# Patient Record
Sex: FEMALE | Race: BLACK OR AFRICAN AMERICAN | Employment: PART TIME | URBAN - METROPOLITAN AREA
[De-identification: names, ages, dates, MRNs, and addresses within clinical notes are randomized per-mention and may not be internally consistent; named-entity substitution may affect disease eponyms.]

---

## 2017-02-08 ENCOUNTER — HOSPITAL ENCOUNTER (EMERGENCY)
Age: 27
Discharge: HOME | End: 2017-02-08
Attending: EMERGENCY MEDICINE
Payer: MEDICAID

## 2017-02-08 VITALS
WEIGHT: 146 LBS | OXYGEN SATURATION: 100 % | RESPIRATION RATE: 18 BRPM | BODY MASS INDEX: 21.62 KG/M2 | HEIGHT: 69 IN | SYSTOLIC BLOOD PRESSURE: 119 MMHG | DIASTOLIC BLOOD PRESSURE: 74 MMHG | HEART RATE: 80 BPM | TEMPERATURE: 98.6 F

## 2017-02-08 DIAGNOSIS — N39.0 URINARY TRACT INFECTION WITH HEMATURIA, SITE UNSPECIFIED: Primary | ICD-10-CM

## 2017-02-08 DIAGNOSIS — R31.9 URINARY TRACT INFECTION WITH HEMATURIA, SITE UNSPECIFIED: Primary | ICD-10-CM

## 2017-02-08 LAB
ANION GAP BLD CALC-SCNC: 9 MMOL/L (ref 3–18)
APPEARANCE UR: ABNORMAL
BACTERIA URNS QL MICRO: ABNORMAL /HPF
BASOPHILS # BLD AUTO: 0 K/UL (ref 0–0.06)
BASOPHILS # BLD: 1 % (ref 0–2)
BILIRUB UR QL: NEGATIVE
BUN SERPL-MCNC: 11 MG/DL (ref 7–18)
BUN/CREAT SERPL: 14 (ref 12–20)
CALCIUM SERPL-MCNC: 9.5 MG/DL (ref 8.5–10.1)
CHLORIDE SERPL-SCNC: 102 MMOL/L (ref 100–108)
CO2 SERPL-SCNC: 29 MMOL/L (ref 21–32)
COLOR UR: YELLOW
CREAT SERPL-MCNC: 0.78 MG/DL (ref 0.6–1.3)
DIFFERENTIAL METHOD BLD: ABNORMAL
EOSINOPHIL # BLD: 0.1 K/UL (ref 0–0.4)
EOSINOPHIL NFR BLD: 1 % (ref 0–5)
EPITH CASTS URNS QL MICRO: ABNORMAL /LPF (ref 0–5)
ERYTHROCYTE [DISTWIDTH] IN BLOOD BY AUTOMATED COUNT: 12.5 % (ref 11.6–14.5)
GLUCOSE SERPL-MCNC: 87 MG/DL (ref 74–99)
GLUCOSE UR STRIP.AUTO-MCNC: NEGATIVE MG/DL
HCG UR QL: NEGATIVE
HCT VFR BLD AUTO: 39.6 % (ref 35–45)
HGB BLD-MCNC: 13.6 G/DL (ref 12–16)
HGB UR QL STRIP: NEGATIVE
KETONES UR QL STRIP.AUTO: NEGATIVE MG/DL
LEUKOCYTE ESTERASE UR QL STRIP.AUTO: ABNORMAL
LYMPHOCYTES # BLD AUTO: 55 % (ref 21–52)
LYMPHOCYTES # BLD: 2.4 K/UL (ref 0.9–3.6)
MCH RBC QN AUTO: 28 PG (ref 24–34)
MCHC RBC AUTO-ENTMCNC: 34.3 G/DL (ref 31–37)
MCV RBC AUTO: 81.5 FL (ref 74–97)
MONOCYTES # BLD: 0.4 K/UL (ref 0.05–1.2)
MONOCYTES NFR BLD AUTO: 9 % (ref 3–10)
MUCOUS THREADS URNS QL MICRO: ABNORMAL /LPF
NEUTS SEG # BLD: 1.5 K/UL (ref 1.8–8)
NEUTS SEG NFR BLD AUTO: 34 % (ref 40–73)
NITRITE UR QL STRIP.AUTO: NEGATIVE
PH UR STRIP: 5.5 [PH] (ref 5–8)
PLATELET # BLD AUTO: 179 K/UL (ref 135–420)
PMV BLD AUTO: 11.4 FL (ref 9.2–11.8)
POTASSIUM SERPL-SCNC: 3.7 MMOL/L (ref 3.5–5.5)
PROT UR STRIP-MCNC: NEGATIVE MG/DL
RBC # BLD AUTO: 4.86 M/UL (ref 4.2–5.3)
RBC #/AREA URNS HPF: 0 /HPF (ref 0–5)
SODIUM SERPL-SCNC: 140 MMOL/L (ref 136–145)
SP GR UR REFRACTOMETRY: 1.03 (ref 1–1.03)
UROBILINOGEN UR QL STRIP.AUTO: 0.2 EU/DL (ref 0.2–1)
WBC # BLD AUTO: 4.4 K/UL (ref 4.6–13.2)
WBC URNS QL MICRO: ABNORMAL /HPF (ref 0–4)

## 2017-02-08 PROCEDURE — 80048 BASIC METABOLIC PNL TOTAL CA: CPT | Performed by: EMERGENCY MEDICINE

## 2017-02-08 PROCEDURE — 81025 URINE PREGNANCY TEST: CPT | Performed by: EMERGENCY MEDICINE

## 2017-02-08 PROCEDURE — 87086 URINE CULTURE/COLONY COUNT: CPT | Performed by: PHYSICIAN ASSISTANT

## 2017-02-08 PROCEDURE — 81001 URINALYSIS AUTO W/SCOPE: CPT | Performed by: EMERGENCY MEDICINE

## 2017-02-08 PROCEDURE — 99283 EMERGENCY DEPT VISIT LOW MDM: CPT

## 2017-02-08 PROCEDURE — 85025 COMPLETE CBC W/AUTO DIFF WBC: CPT | Performed by: EMERGENCY MEDICINE

## 2017-02-08 RX ORDER — NITROFURANTOIN 25; 75 MG/1; MG/1
100 CAPSULE ORAL 2 TIMES DAILY
Qty: 14 CAP | Refills: 0 | Status: SHIPPED | OUTPATIENT
Start: 2017-02-08 | End: 2017-02-15

## 2017-02-08 RX ORDER — PHENAZOPYRIDINE HYDROCHLORIDE 200 MG/1
200 TABLET, FILM COATED ORAL 3 TIMES DAILY
Qty: 6 TAB | Refills: 0 | Status: SHIPPED | OUTPATIENT
Start: 2017-02-08 | End: 2017-02-10

## 2017-02-08 NOTE — ED NOTES
I performed a brief evaluation, including history and physical, of the patient here in triage and I have determined that pt will need further treatment and evaluation from the main side ER physician. I have placed initial orders to help in expediting patients care. February 08, 2017 at 11:26 AM - Blane Beltran MD        Visit Vitals    Pulse 74    Temp 98.9 °F (37.2 °C)    Resp 14    Ht 5' 9\" (1.753 m)    Wt 66.2 kg (146 lb)    SpO2 98%    BMI 21.56 kg/m2          Patient with abdominal and pelvic pain for the last three days. More pain in right pelvic than left per patient. Denied vaginal discharge. Urinary frequency is present.   DEVIN BERGERON

## 2017-02-08 NOTE — ED PROVIDER NOTES
Patient is a 27 y.o. female presenting with abdominal pain and frequency. The history is provided by the patient. Abdominal Pain    Associated symptoms include frequency. Urinary Frequency    Associated symptoms include frequency and abdominal pain. Chyna Tuttle is a 27 y.o. female presents with c/o abdominal pain for the past couple of days. Denies fever or n/v. Denies vaginal discharge or pain. History reviewed. No pertinent past medical history. History reviewed. No pertinent past surgical history. History reviewed. No pertinent family history. Social History     Social History    Marital status: SINGLE     Spouse name: N/A    Number of children: N/A    Years of education: N/A     Occupational History    Not on file. Social History Main Topics    Smoking status: Never Smoker    Smokeless tobacco: Not on file    Alcohol use No    Drug use: No    Sexual activity: Not on file     Other Topics Concern    Not on file     Social History Narrative         ALLERGIES: Bactrim [sulfamethoprim ds]    Review of Systems   Gastrointestinal: Positive for abdominal pain. Genitourinary: Positive for frequency. Constitutional:  Denies malaise, fever, chills. Head:  Denies injury. Face:  Denies injury or pain. ENMT:  Denies sore throat. Neck:  Denies injury or pain. Chest:  Denies injury. Cardiac:  Denies chest pain or palpitations. Respiratory:  Denies cough, wheezing, difficulty breathing, shortness of breath. GI/ABD: Pain  Denies injury, distention, nausea, vomiting, diarrhea. :  Frequency Denies injury, pain, dysuria or urgency. Back:  Denies injury or pain. Pelvis:  Denies injury or pain. Extremity/MS:  Denies injury or pain. Neuro:  Denies headache, LOC, dizziness, neurologic symptoms/deficits/paresthesias. Skin: Denies injury, rash, itching or skin changes.         Vitals:    02/08/17 1125   Pulse: 74   Resp: 14   Temp: 98.9 °F (37.2 °C)   SpO2: 98% Weight: 66.2 kg (146 lb)   Height: 5' 9\" (1.753 m)            Physical Exam   Nursing note and vitals reviewed. CONSTITUTIONAL: Alert, in no apparent distress; well-developed; well-nourished. HEAD:  Normocephalic, atraumatic. EYES: PERRL; EOM's intact. ENTM: Nose: No rhinorrhea; Throat: mucous membranes moist. Posterior pharynx-normal.  Neck:  No JVD, supple without lymphadenopathy. RESP: Chest clear, equal breath sounds. CV: S1 and S2 WNL; No murmurs, gallops or rubs. GI: Abdomen soft and non-tender. No masses or organomegaly. UPPER EXT:  Normal inspection. LOWER EXT: Normal inspection. NEURO: strength 5/5 and sym, sensation intact. SKIN: No rashes; Normal for age and stage. PSYCH:  Alert and oriented, normal affect. MDM  Number of Diagnoses or Management Options  Diagnosis management comments: Diagnosis management comments: DDX: Pyelonephritis, Acute Cystitis, Hemorrhagic Cystitis, Interstitial Cystitis, Pelvic Inflammatory Disease, Ovarian cyst pain, Nonspecific urethritis (chlamydia or ureaplasma), renal colic, Bladder outlet obstruction, Neurogenic bladder, Pelvic pain disorder, Endometriosis, Vaginitis, Urosepsis, Nephrolithiasis, Urolithiasis, Monolial Vaginitis, Trichomoniasis. IMPRESSION AND MEDICAL DECISION MAKING:  Based upon the patient's presentation with noted HPI and PE, along with the work up done in the emergency department, I believe that the patient has a UTI. Will treat and have her follow up with her PCP. The patient will be discharged home. Warning signs of worsening condition were discussed and understood by the patient. Based on patient's age, coexisting illness, exam, and the results of this ED evaluation, the decision to treat as an outpatient was made. Based on the information available at time of discharge, acute pathology requiring immediate intervention was deemed relative unlikely.  While it is impossible to completely exclude the possibility of underlying serious disease or worsening of condition, I feel the relative likelihood is extremely low. I discussed this uncertainty with the patient, who understood ED evaluation and treatment and felt comfortable with the outpatient treatment plan. All questions regarding care, test results, and follow up were answered. The patient is stable and appropriate to discharge. They understand that they should return to the emergency department for any new or worsening symptoms. I stressed the importance of follow up for repeat assessment and possibly further evaluation/treatment.            Amount and/or Complexity of Data Reviewed  Clinical lab tests: ordered and reviewed      ED Course       Procedures

## 2017-02-09 LAB
BACTERIA SPEC CULT: NORMAL
SERVICE CMNT-IMP: NORMAL

## 2019-04-11 ENCOUNTER — OFFICE VISIT (OUTPATIENT)
Dept: OBGYN CLINIC | Age: 29
End: 2019-04-11

## 2019-04-11 VITALS
DIASTOLIC BLOOD PRESSURE: 67 MMHG | RESPIRATION RATE: 20 BRPM | SYSTOLIC BLOOD PRESSURE: 114 MMHG | HEIGHT: 69 IN | OXYGEN SATURATION: 100 % | WEIGHT: 169.8 LBS | HEART RATE: 83 BPM | BODY MASS INDEX: 25.15 KG/M2

## 2019-04-11 DIAGNOSIS — Z30.09 FAMILY PLANNING: ICD-10-CM

## 2019-04-11 DIAGNOSIS — Z01.419 WELL WOMAN EXAM WITH ROUTINE GYNECOLOGICAL EXAM: Primary | ICD-10-CM

## 2019-04-11 DIAGNOSIS — Z11.3 ROUTINE SCREENING FOR STI (SEXUALLY TRANSMITTED INFECTION): ICD-10-CM

## 2019-04-11 RX ORDER — NORETHINDRONE ACETATE AND ETHINYL ESTRADIOL 1MG-20(21)
1 KIT ORAL DAILY
Qty: 3 PACKAGE | Refills: 3 | Status: SHIPPED | OUTPATIENT
Start: 2019-04-11 | End: 2021-12-21

## 2019-04-11 NOTE — PROGRESS NOTES
Subjective:   34 y.o. female for Well Woman Check. She is without complaints today. Patient's last menstrual period was 04/05/2019. Social History: single partner, contraception - none. Pertinent past medical hstory: no history of HTN, DVT, CAD, DM, liver disease, migraines or smoking. There is no problem list on file for this patient. There are no active problems to display for this patient. Allergies   Allergen Reactions    Bactrim [Sulfamethoprim] Hives     History reviewed. No pertinent past medical history. History reviewed. No pertinent surgical history. Family History   Problem Relation Age of Onset    Breast Cancer Maternal Grandmother      Social History     Tobacco Use    Smoking status: Never Smoker    Smokeless tobacco: Current User    Tobacco comment: vape no tobacco   Substance Use Topics    Alcohol use: Yes     Comment: socially        ROS:  Feeling well. No dyspnea or chest pain on exertion. No abdominal pain, change in bowel habits, black or bloody stools. No urinary tract symptoms. GYN ROS: normal menses, no abnormal bleeding, pelvic pain or discharge, no breast pain or new or enlarging lumps on self exam. No neurological complaints. Objective:     Visit Vitals  /67   Pulse 83   Resp 20   Ht 5' 9\" (1.753 m)   Wt 169 lb 12.8 oz (77 kg)   LMP 04/05/2019   SpO2 100%   BMI 25.08 kg/m²     The patient appears well, alert, oriented x 3, in no distress. ENT normal.  Neck supple. No adenopathy or thyromegaly. PATRIA. Lungs are clear, good air entry, no wheezes, rhonchi or rales. S1 and S2 normal, no murmurs, regular rate and rhythm. Abdomen soft without tenderness, guarding, mass or organomegaly. Extremities show no edema, normal peripheral pulses. Neurological is normal, no focal findings.     BREAST EXAM: breasts appear normal, no suspicious masses, no skin or nipple changes or axillary nodes    PELVIC EXAM: normal external genitalia, vulva, vagina, cervix, uterus and adnexa    Assessment/Plan:   well woman  pap smear  return annually or prn    ICD-10-CM ICD-9-CM    1. Well woman exam with routine gynecological exam Z01.419 V72.31 PAP IG, CT-NG-TV, RFX APTIMA HPV ASCUS ([id number])   2. Routine screening for STI (sexually transmitted infection) Z11.3 V74.5 T PALLIDUM AB      HEP B SURFACE AG      HEPATITIS C AB      HIV 1/2 AG/AB, [address] RFLX CONFIRM     Encounter Diagnoses   Name Primary?  Well woman exam with routine gynecological exam Yes    Routine screening for STI (sexually transmitted infection)      Orders Placed This Encounter    T PALLIDUM AB    HEP B SURFACE AG    HEPATITIS C AB    HIV 1/2 AG/AB, 4TH GENERATION,W RFLX CONFIRM    PAP IG, CT-NG-TV, RFX APTIMA HPV ASCUS ([id number])       .

## 2019-04-18 LAB
C TRACH RRNA CVX QL NAA+PROBE: NEGATIVE
CYTOLOGIST CVX/VAG CYTO: NORMAL
CYTOLOGY CVX/VAG DOC THIN PREP: NORMAL
DX ICD CODE: NORMAL
LABCORP, 190119: NORMAL
Lab: NORMAL
N GONORRHOEA RRNA CVX QL NAA+PROBE: NEGATIVE
OTHER STN SPEC: NORMAL
PATH REPORT.FINAL DX SPEC: NORMAL
STAT OF ADQ CVX/VAG CYTO-IMP: NORMAL
T VAGINALIS RRNA SPEC QL NAA+PROBE: NEGATIVE

## 2021-04-06 ENCOUNTER — OFFICE VISIT (OUTPATIENT)
Dept: SURGERY | Age: 31
End: 2021-04-06
Payer: MEDICAID

## 2021-04-06 VITALS
WEIGHT: 293 LBS | OXYGEN SATURATION: 100 % | SYSTOLIC BLOOD PRESSURE: 126 MMHG | RESPIRATION RATE: 20 BRPM | BODY MASS INDEX: 44.41 KG/M2 | TEMPERATURE: 97.6 F | HEART RATE: 80 BPM | DIASTOLIC BLOOD PRESSURE: 76 MMHG | HEIGHT: 68 IN

## 2021-04-06 DIAGNOSIS — E66.01 MORBID OBESITY WITH BMI OF 45.0-49.9, ADULT (HCC): Primary | ICD-10-CM

## 2021-04-06 PROCEDURE — 99205 OFFICE O/P NEW HI 60 MIN: CPT | Performed by: SURGERY

## 2021-04-06 NOTE — PROGRESS NOTES
Chief Complaint   Patient presents with    Advice Only     confirmed video     Pt ID confirmed    Weight Loss Metrics 4/6/2021 4/6/2021 4/11/2019 12/25/2017 2/8/2017 2/28/2016 10/15/2013   Pre op / Initial Wt 304 - - - - - -   Today's Wt - 304 lb 169 lb 12.8 oz 230 lb 146 lb 174 lb 142 lb   BMI - 46.22 kg/m2 25.08 kg/m2 36.02 kg/m2 21.56 kg/m2 25.68 kg/m2 20.96 kg/m2   Ideal Body Wt 143 - - - - - -   Excess Body Wt 161 - - - - - -   Goal Wt 175 - - - - - -   Wt loss to date 0 - - - - - -   % Wt Loss 0 - - - - - -   80% .8 - - - - - -       Body mass index is 46.22 kg/m².

## 2021-04-06 NOTE — PROGRESS NOTES
Consult    Patient: Hermilo Brewster MRN: [id number]  SSN: [id number]    YOB: 1990  Age: 36 y.o. Sex: female      Initial  Consultation for Bariatric Surgery     Hermilo Brewster is a   31-year-old black female who presents for discussion of surgical options available for definitive management of her clinically severe obesity. Onset obesity: Childhood  Weight at age 27: 280 pounds and a 5 foot 8 inch frame  Maximum/current weight: 304 pounds on 5 foot 8 inch frame with a body mass index of 46  Pattern/progression of weight gain: Slowly progressive interrupted by dietary weight loss followed by regain of lost weight as well as additional weight thus exhibiting yoyo effect after current maximum weight of 304 pounds  Maximum medical weight loss attempts: Multiple unsupervised and supervised weight loss trials of maximal loss occurring in  over 6 months losing 100 pounds. Comorbidities: Clinical obstructive sleep apnea  Current weight: 304. BMI: 46. Goal body weight: 43  Excess body weight: 61  Estimated postsurgical weight loss: 129  Postsurgical goal weight: 175  Allergies: Bactrim  Current medications: See medication list  1. Clinically severe obesity with body mass index of 46 with obesity related comorbidity of clinical obstructive sleep apnea and  2. Vitamin D deficiency  3. Anxiety  Past surgical history:  1. Laparoscopic cholecystectomy   Social history:  Denies utilization of both tobacco and alcohol  Family history: Mother 60-reactive airway disease  Father  56-unknown etiology  Siblings x5-healthy  Allergies   Allergen Reactions    Bactrim [Sulfamethoprim] Hives    Bactrim [Sulfamethoprim Ds] Hives     Denies allergy 2019       Current Outpatient Medications on File Prior to Visit   Medication Sig Dispense Refill    norethindrone-ethinyl estradiol (JUNEL FE 1/20) 1 mg-20 mcg (21)/75 mg (7) tab Take 1 Tab by mouth daily.  3 Package 3    phentermine (ADIPEX_P) 15 mg capsule Take 15 mg by mouth every morning. No current facility-administered medications on file prior to visit. No past medical history on file. Past Surgical History:   Procedure Laterality Date    HX CHOLECYSTECTOMY         Social History     Tobacco Use    Smoking status: Never Smoker    Smokeless tobacco: Current User    Tobacco comment: vape no tobacco   Substance Use Topics    Alcohol use: Not Currently     Comment: socially    Drug use: Never       Family History   Problem Relation Age of Onset    Breast Cancer Maternal Grandmother          Review of Systems:      General: Denies fevers, chills, night sweats, fatigue, weight loss, or weight gain. HEENT: Denies changes in auditory or visual acuity, recurrent pharyngitis, epistaxis, chronic rhinorrhea, vertigo    Respiratory: Denies increasing shortness of breath, productive cough, hemoptysis    Cardiac: Denies known history of cardiac disease, heart murmur, palpitations    GI: Denies dysphagia, recurrent emesis, hematemesis, changes in bowel habits, hematochezia, melena    : Denies hematuria frequency urgency dysuria    Musculoskeletal: Denies fractures, dislocations    Neurologic: Denies history of CVA, paralysis paresthesias, recurrent cephalgia, seizures    Endocrine: Denies polyuria, polydipsia, polyphagia, heat and cold intolerance    Lymph/heme: Denies a history of malignancy, anemia, bruising, blood transfusions    Integumentary: Negative for dermatitis         Physical Exam    Visit Vitals  /76   Pulse 80   Temp 97.6 °F (36.4 °C)   Resp 20   Ht 5' 8\" (1.727 m)   Wt 137.9 kg (304 lb)   SpO2 100%   BMI 46.22 kg/m²       Nursing note reviewed. General: Clinically severely obese in no acute distress, nontoxic in appearance.   Head: Normocephalic, atraumatic  Mouth: Clear, no overt lesions, oral mucosa is pink and moist.  Neck: Supple, no masses, no adenopathy or carotid bruits, trachea midline  Resp: Clear to auscultation bilaterally, no wheezing, rhonchi, or rales, excursions normal and symmetrical.  Cardio: Regular rate and rhythm, no murmurs, clicks, gallops, or rubs. Abdomen: Obese, soft, nontender, nondistended, normoactive bowel sounds, no hernias. Extremities: Warm, well perfused, no tenderness or swelling, normal gait/station, without edema or varicosities  Neuro: Sensation and strength grossly intact and symmetrical.  Psych: Alert and oriented to person, place, and time. Impression/Plan:    72-year-old black female with a body mass index of 46 with obesity morbidities of clinical obstructive sleep apnea who would benefit from bariatric surgery. We have had an extensive discussion with regard to the risks, benefits and likely outcomes of the operation. We've discussed the restrictive and malabsorptive nature of the gastric bypass and compared and contrasted with the sleeve gastrectomy. The patient understands the likelihood of losing approximately 80% of their excess weight in 12 to 18 months. The patient also understands the risks including but not limited to bleeding, infection, need for reoperation, ulcers, leaks and strictures, bowel obstruction secondary to adhesions and internal hernias, DVT, PE, heart attack, stroke, and death. Patient also understands risks of inadequate weight loss, excess weight loss, vitamin insufficiency, protein malnutrition, excess skin, and loss of hair. We have reviewed the components of a successful postoperative course including requirement for a high protein, low carbohydrate diet, 60 oz a day of zero calorie liquids, daily vitamin supplementation, daily exercise, regular follow-up, and participation in support groups.  At this time we will enroll the patient in our bariatric program, undertake routine laboratory evaluation, chest X-ray, EKG, possible UGI and evaluation by  nutritionist as well as psychologist and pending their satisfactory completion of the preop evaluation, plan to pursue laparoscopic potentially open gastric bypass to achieve definitive durable weight loss on a personal level with expected resolution of obesity related comorbidities

## 2021-04-16 ENCOUNTER — HOSPITAL ENCOUNTER (OUTPATIENT)
Dept: BARIATRICS/WEIGHT MGMT | Age: 31
Discharge: HOME | End: 2021-04-16

## 2021-04-16 ENCOUNTER — DOCUMENTATION ONLY (OUTPATIENT)
Dept: BARIATRICS/WEIGHT MGMT | Age: 31
End: 2021-04-16

## 2021-04-16 NOTE — PROGRESS NOTES
[address] Kristina Loss [address] [facility], [address]    Patient's Name: Thetracy Carcamo   Age: 36 y.o. YOB: 1990   Sex: female    Date:   4/16/2021    Insurance:              Session: 1 of  6  Surgeon:  Dr. Charmayne Burr    Height: 5 f8   Weight:    304      Lbs. BMI: 46.3   Pounds Lost since last month: 0                 Pounds Gained since last month: 0    Starting Weight: 304     Previous Months Weight: 304  Overall Pounds Lost: 0   Overall Pounds Gained: 0    Do you smoke? None    Alcohol intake:  Number of drinks at a time:  None  Number of times a week: None    Class Guidelines    Guidelines are reviewed with patient at the start of every class. 1. Patient understands that weight loss trial classes must be consecutive. Patient understands if they miss a class, it is their responsibility to contact me to reschedule class. I will reach out to patient after their first no show. 2.  Patient understands the expectations that weight maintenance/weight loss is expected during the classes. Failure to demonstrate changes may result in one extra month of weight loss trial, followed by going back to see the surgeon. Patient understands that they CAN NOT gain any weight during the weight loss trial.  Gaining weight will result in extra classes. 3. Patient is also instructed to be doing their labs, blood work, psych visit, support group and any other test that the surgeon has used while they are working on their weight loss trial.  4.  Patient was instructed to bring their blue binder to every class and appointment. Other Pertinent Information:     Changes Made Since Last Class: Eating more vegetables. Eating Habits and Behaviors      We started off class today by reviewing key diet principles.   Patient was given a very specific list of foods that they can eat, which included meat, fish, vegetables, eggs, cheese, fats, soy, and berries. Patient was also given a list of foods that should be avoided. These included sweets (candy, soda, baked goods, ice cream), and starches, including pasta, rice, crackers, chips, oatmeal, bread. We talked about appropriate protein-based snacks, including deli meat, low fat cheese, yogurt, hummus, small handful of almonds. We talked about working on sipping fluid throughout the day and working towards 64 ounces. I have recommended water, Crystal light, sugar free drinks, but eliminating soda, sweet tea, and fruit juices. I also gave a power point on ways to help with the metabolism. Some ways that can help boost one's metabolism include healthy snacking. Eating 6 small meals in the pre op phase can help keep the metabolism revved up. It is recommended to focus on protein-based snacks. Exercise is another way to increase resting metabolic rate. The more lean muscle one has, the more calories they will burn at rest.  Dehydration can slow down one's metabolism, as well. Patient is encouraged to keep sipping to work towards 64 ounces of fluid per day. Protein is another booster for metabolism. Foods high in carbohydrates and fat slow down the metabolism. Patient's current diet habits include: Patient states she is eating a boiled egg for breakfast.  Lunch is keto caprese chicken. Dinner is cilantro lime steak. She states that her portions are a dinner size plate. She states she does not snack often, she may when she is menstruating, but states otherwise it is rare to snack. She states she is drinking 5 bottles of water per day. She states she has cut out all liquid calories. Physical Activity/Exercise    Comments:     Currently for exercise, patient is use the treadmill or bike for 1 hour daily. We talked about activities for patient to do, including walking, swimming, or chair exercises.   I also talked with patient about doing some strength training, which helps the metabolism, as well. Patient understands the importance of establishing an activity routine and that surgery is only a small piece of puzzle, but exercise and diet changes will play a role in long term success. Behavior Modification       Comments: In the power point, Mastering Your Metabolism, I also gave behaviors that can help with one's metabolism. These include not skipping meals and being sure to feed and fuel that body rather than going large gaps and putting the body in starvation mode. Patient is encouraged to eat within 1 hour of waking up and have a cut off time in the evening. We talked about night time syndrome where a person may skip breakfast and lunch and then consume the majority of their calories from dinner until bed time. I  have talked about how important it it to get 3 meals in per day, eat breakfast, and BREAK THE FAST. One goal for next month includes:  1. Maintain a healthy lifestyle.       Kassandra Parisi Dandre 87 RD  4/16/2021

## 2021-05-13 ENCOUNTER — DOCUMENTATION ONLY (OUTPATIENT)
Dept: BARIATRICS/WEIGHT MGMT | Age: 31
End: 2021-05-13

## 2021-05-13 NOTE — PROGRESS NOTES
[address] Kristina Loss [address] [facility], [address]    Patient's Name: Hermilo Brewster   Age: 36 y.o. YOB: 1990   Sex: female        Insurance:              Session: 2 of 6  Revision:     Surgeon:  Dr. Amalia Dennison    Height: 5 f 8   Weight:    298      Lbs. BMI:    Pounds Lost since last month: 6                 Pounds Gained since last month: 0    Starting Weight: 304     Previous Months Weight: 304  Overall Pounds Lost: 6   Overall Pounds Gained: 0    Do you smoke? None    Alcohol intake:  Number of drinks at a time:  None  Number of times a week: None    Class Guidelines    Guidelines are reviewed with patient at the start of every class. 1. Patient understands that weight loss trial classes must be consecutive. Patient understands if they miss a class, it is their responsibility to contact me to reschedule class. I will reach out to patient after their first no show. 2.  Patient understands the expectations that weight maintenance/weight loss is expected during the classes. Failure to demonstrate changes may result in one extra month of weight loss trial, followed by going back to see the surgeon. Patient understands that they CAN NOT gain any weight during the weight loss trial.  Gaining weight will result in extra classes. 3. Patient is also instructed to be doing their labs, blood work, psych visit, support group and any other test that the surgeon has used while they are working on their weight loss trial.  4.  Patient was instructed to bring their blue binder to every class and appointment. Other Pertinent Information:     Changes Made Since Last Class: Eating more fruits and vegetables. Left starch alone. Cut out liquid calories. Drinking more water. Eating Habits and Behaviors      Today in class we talked about the key diet principles.   We start off each class talking about these principles, which include cutting out liquid calories and focusing on water or other non-calorie, non-carbonated drinks. We also spent time talking about carbohydrates, including foods that have carbohydrates and the goal to keep daily carbohydrates under 100 grams per day. Patient was given ideas of meal and snack choices that are lower in carbohydrates and focus more on protein. Patient was encouraged to start trying protein shakes and was given a list of suggestions. The main topic of class today was: Portion Control. We reviewed in class a power point filled with tips on ways to control portions, including using smaller plates, boxing up portions at a restaurant before starting to eat, and not eating from the container, but rather portioning snacks into smaller bags. Patient's were encouraged to food journal, which helps increase awareness of what and how much they are eating. It was emphasized to patient the importance of reading labels and portion sizes, but also applying these portion sizes. Patient was given a list of items that can help to make portion control easier. For example, a deck of cards or a palm of a hand is a proper portion of meat, a fist is a cup or a proper serving of vegetables. Patient was given 10 tips to help with the portion control. Patient's current diet habits include: Patient is eating 3 melas per day. She is eating oatmeal for breakfast.  Lunch is a Premier shake. Dinner is baked chicken, brown rice, and vegetables. She states she has been using a smaller paper plate to try to reduce her portions. She states she is no longer eating sweets. She is eating some carbohydrates, but states this is typically brown rice. She is drinking only water and has cut out all other liquid calories. Physical Activity/Exercise    Comments:     Currently for exercise, patient is Monday-Sunday for an hour on the treadmill, crunches, and an exercise bike.   Patient was given a list of ideas for activity and was encouraged to incorporate 30 minutes a day into their daily routine. Behavior Modification       Comments: In class, we also focused on the behavior aspects of weight management. This includes being a mindful eater and not eating in front of the TV. Patient is also encouraged to take 20 minutes to eat a meal and eat at a table. One goal for next month includes:  1. Continue with her current activity routine.        Kassandra Cook Dandre 87 RD  May 14, 2021

## 2021-05-14 ENCOUNTER — HOSPITAL ENCOUNTER (OUTPATIENT)
Dept: BARIATRICS/WEIGHT MGMT | Age: 31
Discharge: HOME | End: 2021-05-14

## 2021-06-18 ENCOUNTER — HOSPITAL ENCOUNTER (OUTPATIENT)
Dept: BARIATRICS/WEIGHT MGMT | Age: 31
Discharge: HOME | End: 2021-06-18

## 2021-06-18 ENCOUNTER — DOCUMENTATION ONLY (OUTPATIENT)
Dept: BARIATRICS/WEIGHT MGMT | Age: 31
End: 2021-06-18

## 2021-06-18 NOTE — PROGRESS NOTES
[address] Kristina Loss [address] [facility], [address]    Patient's Name: Tabitha Ruiz   Age: 36 y.o. YOB: 1990   Sex: female    Date:   6/18/2021    Insurance:            Session: 3 of  6  Revision:   Surgeon:  Dr. Basil Grewal    Height: 5 f 8 Weight:    290      Lbs. BMI: 45.4   Pounds Lost since last month: 11               Pounds Gained since last month: 0    Starting Weight: 304  Previous Months Weight: 301  Overall Pounds Lost: 14 Overall Pounds Gained: 0      Do you smoke? None    Alcohol intake:  Number of drinks at a time:  None  Number of times a week: None    Class Guidelines    Guidelines are reviewed with patient at the start of every class. 1. Patient understands that weight loss trial classes must be consecutive. Patient understands if they miss a class, it is their responsibility to contact me to reschedule class. I will reach out to patient after their first no show. 2.  Patient understands the expectations that weight maintenance/weight loss is expected during the classes. Failure to demonstrate changes may result in one extra month of weight loss trial, followed by going back to see the surgeon. Patient understands that they CAN NOT gain any weight during the weight loss trial.  Gaining weight will result in extra classes. 3. Patient is also instructed to be doing their labs, blood work, psych visit, support group and any other test that the surgeon has used while they are working on their weight loss trial.  4.  Patient was instructed to bring their blue binder to every class and appointment. Other Pertinent Information:     Changes Made Since Last Class: Still trying to eat healthier. Eating Habits and Behaviors    Today in class, I reviewed a power point that discussed Nutrition, Behavior, and Exercise changes to start working on.   Some of the eating behaviors that we discussed included the importance of eating breakfast.  We talked about some of the reasons that people don't eat breakfast and food choices that would be appropriate. We also talked about avoiding liquid calories. Patient is encouraged to aim for 64 ounces of fluid per day and trying to get them from water, Crystal Light, sugar free drinks. We also talked about eating out options that are healthier. Patient was encouraged to always request sauces and dressings on the side and request a salad, broth-based soup, or vegetables in place of fries. Patient's current diet habits include: 3 meals per day. Patient is eating oatmeal for breakfast .  Lunch is salmon with broccoli. DInner is protein and vegetable with brown rice. She states she is measuring her portions out, so she knows she is sticking to a smaller portion. She states she is no longer craving sugar. She is is not eating sweets. Patient states she is doing more meal prep at home. She states she has not had any soda in 4 months. Physical Activity/Exercise    Comments: We talked about exercise. Patient was given reasons of why exercise is so important and how that can help with their long-term success. I have encouraged patient to get a support system to help with the activity. Currently for activity, patient is exercising for 1 hour, Monday- Saturday. Behavior Modification       Comments: We also talked about behavior modifications. We talked about eating triggers, such as eating in front of the TV and solutions, such as making the TV a no eating zone. If patient is eating out out of emotion, food will only temporarily solve that. Patient is encouraged to HALT and assess if they are eating because they are Hungry, or out of emotions: Anxious, Lonely, Tired, which the food will only temporarily solve. We also talked about ways to prevent relapse. Goals that patient wants to work on includes:  1. Continue with these habits.   She states she recently lost 10 pounds and wants to keep it up.   [address] [facility], Luite Dandre [address]  6/18/2021

## 2021-06-29 ENCOUNTER — HOSPITAL ENCOUNTER (OUTPATIENT)
Dept: LAB | Age: 31
Discharge: HOME | End: 2021-06-29
Payer: MEDICAID

## 2021-06-29 ENCOUNTER — HOSPITAL ENCOUNTER (OUTPATIENT)
Dept: LAB | Age: 31
Discharge: HOME | End: 2021-06-29

## 2021-06-29 ENCOUNTER — HOSPITAL ENCOUNTER (OUTPATIENT)
Dept: GENERAL RADIOLOGY | Age: 31
Discharge: HOME | End: 2021-06-29
Payer: MEDICAID

## 2021-06-29 DIAGNOSIS — E66.01 MORBID OBESITY WITH BMI OF 45.0-49.9, ADULT (HCC): ICD-10-CM

## 2021-06-29 LAB — XX-LABCORP SPECIMEN COL,LCBCF: NORMAL

## 2021-06-29 PROCEDURE — 93005 ELECTROCARDIOGRAM TRACING: CPT

## 2021-06-29 PROCEDURE — 71046 X-RAY EXAM CHEST 2 VIEWS: CPT

## 2021-06-29 PROCEDURE — 99001 SPECIMEN HANDLING PT-LAB: CPT

## 2021-07-02 LAB
ATRIAL RATE: 66 BPM
CALCULATED P AXIS, ECG09: 48 DEGREES
CALCULATED R AXIS, ECG10: 58 DEGREES
CALCULATED T AXIS, ECG11: 31 DEGREES
DIAGNOSIS, 93000: NORMAL
P-R INTERVAL, ECG05: 190 MS
Q-T INTERVAL, ECG07: 392 MS
QRS DURATION, ECG06: 88 MS
QTC CALCULATION (BEZET), ECG08: 410 MS
VENTRICULAR RATE, ECG03: 66 BPM

## 2021-07-06 ENCOUNTER — HOSPITAL ENCOUNTER (OUTPATIENT)
Dept: LAB | Age: 31
Discharge: HOME | End: 2021-07-06

## 2021-07-06 ENCOUNTER — OFFICE VISIT (OUTPATIENT)
Dept: SURGERY | Age: 31
End: 2021-07-06

## 2021-07-06 VITALS — WEIGHT: 262.4 LBS | BODY MASS INDEX: 39.77 KG/M2 | HEIGHT: 68 IN

## 2021-07-06 DIAGNOSIS — Z01.818 PREOP TESTING: Primary | ICD-10-CM

## 2021-07-06 DIAGNOSIS — E66.9 OBESITY, UNSPECIFIED CLASSIFICATION, UNSPECIFIED OBESITY TYPE, UNSPECIFIED WHETHER SERIOUS COMORBIDITY PRESENT: Primary | ICD-10-CM

## 2021-07-06 LAB
25(OH)D3+25(OH)D2 SERPL-MCNC: 18.5 NG/ML (ref 30–100)
ALBUMIN SERPL-MCNC: 4.2 G/DL (ref 3.9–5)
ALBUMIN/GLOB SERPL: 1.5 {RATIO} (ref 1.2–2.2)
ALP SERPL-CCNC: 58 IU/L (ref 48–121)
ALT SERPL-CCNC: 21 IU/L (ref 0–32)
APPEARANCE UR: CLEAR
AST SERPL-CCNC: 14 IU/L (ref 0–40)
BACTERIA #/AREA URNS HPF: ABNORMAL /[HPF]
BILIRUB SERPL-MCNC: 0.7 MG/DL (ref 0–1.2)
BILIRUB UR QL STRIP: NEGATIVE
BUN SERPL-MCNC: 9 MG/DL (ref 6–20)
BUN/CREAT SERPL: 11 (ref 9–23)
CALCIUM SERPL-MCNC: 9.1 MG/DL (ref 8.7–10.2)
CASTS URNS QL MICRO: ABNORMAL /LPF
CHLORIDE SERPL-SCNC: 105 MMOL/L (ref 96–106)
CHOLEST SERPL-MCNC: 190 MG/DL (ref 100–199)
CO2 SERPL-SCNC: 21 MMOL/L (ref 20–29)
COLOR UR: YELLOW
CREAT SERPL-MCNC: 0.83 MG/DL (ref 0.57–1)
EPI CELLS #/AREA URNS HPF: ABNORMAL /HPF (ref 0–10)
ERYTHROCYTE [DISTWIDTH] IN BLOOD BY AUTOMATED COUNT: 13.6 % (ref 11.7–15.4)
FERRITIN SERPL-MCNC: 21 NG/ML (ref 15–150)
FOLATE SERPL-MCNC: 12.7 NG/ML
GLOBULIN SER CALC-MCNC: 2.8 G/DL (ref 1.5–4.5)
GLUCOSE SERPL-MCNC: 86 MG/DL (ref 65–99)
GLUCOSE UR QL: NEGATIVE
HCT VFR BLD AUTO: 38.7 % (ref 34–46.6)
HDLC SERPL-MCNC: 67 MG/DL
HGB BLD-MCNC: 12.6 G/DL (ref 11.1–15.9)
HGB UR QL STRIP: NEGATIVE
IRON SERPL-MCNC: 131 UG/DL (ref 27–159)
KETONES UR QL STRIP: NEGATIVE
LDLC SERPL CALC-MCNC: 109 MG/DL (ref 0–99)
LEUKOCYTE ESTERASE UR QL STRIP: ABNORMAL
MCH RBC QN AUTO: 27.3 PG (ref 26.6–33)
MCHC RBC AUTO-ENTMCNC: 32.6 G/DL (ref 31.5–35.7)
MCV RBC AUTO: 84 FL (ref 79–97)
MICRO URNS: ABNORMAL
NITRITE UR QL STRIP: NEGATIVE
PH UR STRIP: 7.5 [PH] (ref 5–7.5)
PLATELET # BLD AUTO: 200 X10E3/UL (ref 150–450)
POTASSIUM SERPL-SCNC: 3.9 MMOL/L (ref 3.5–5.2)
PROT SERPL-MCNC: 7 G/DL (ref 6–8.5)
PROT UR QL STRIP: NEGATIVE
RBC # BLD AUTO: 4.61 X10E6/UL (ref 3.77–5.28)
RBC #/AREA URNS HPF: ABNORMAL /HPF (ref 0–2)
SODIUM SERPL-SCNC: 139 MMOL/L (ref 134–144)
SP GR UR: 1.02 (ref 1–1.03)
TRIGL SERPL-MCNC: 75 MG/DL (ref 0–149)
TSH SERPL DL<=0.005 MIU/L-ACNC: 0.68 UIU/ML (ref 0.45–4.5)
UROBILINOGEN UR STRIP-MCNC: 0.2 MG/DL (ref 0.2–1)
VIT B1 BLD-SCNC: 70.6 NMOL/L (ref 66.5–200)
VIT B12 SERPL-MCNC: >2000 PG/ML (ref 232–1245)
VLDLC SERPL CALC-MCNC: 14 MG/DL (ref 5–40)
WBC # BLD AUTO: 5.7 X10E3/UL (ref 3.4–10.8)
WBC #/AREA URNS HPF: ABNORMAL /HPF (ref 0–5)
XX-LABCORP SPECIMEN COL,LCBCF: NORMAL

## 2021-07-06 PROCEDURE — 99001 SPECIMEN HANDLING PT-LAB: CPT

## 2021-07-06 NOTE — PROGRESS NOTES
Anand Lopez is a 36 y.o. female  Chief Complaint   Patient presents with    Testing     pt presents today to complete an H-pylori breath test.

## 2021-07-07 LAB — UREA BREATH TEST QL: NEGATIVE

## 2021-07-08 ENCOUNTER — TELEPHONE (OUTPATIENT)
Dept: SURGERY | Age: 31
End: 2021-07-08

## 2021-07-08 DIAGNOSIS — R82.90 ABNORMAL URINALYSIS: Primary | ICD-10-CM

## 2021-07-16 ENCOUNTER — HOSPITAL ENCOUNTER (OUTPATIENT)
Dept: BARIATRICS/WEIGHT MGMT | Age: 31
Discharge: HOME | End: 2021-07-16

## 2021-07-16 ENCOUNTER — DOCUMENTATION ONLY (OUTPATIENT)
Dept: BARIATRICS/WEIGHT MGMT | Age: 31
End: 2021-07-16

## 2021-07-16 NOTE — PROGRESS NOTES
[address] Kristina Loss [address] [facility], [address]    Patient's Name: Sandro Renteria   Age: 36 y.o. YOB: 1990   Sex: female    Date:   7/16/2021    Insurance:              Session: 4 of 6  Revision:     Surgeon:  Dr. Chanelle Moyer    Height: 5 f 8   Weight:    285      Lbs. BMI:    Pounds Lost since last month: 6                 Pounds Gained since last month: 0    Starting Weight: 304     Previous Months Weight: 291  Overall Pounds Lost: 19   Overall Pounds Gained: 0      Do you smoke? None    Alcohol intake:  Number of drinks at a time:  None  Number of times a week: None    Class Guidelines    Guidelines are reviewed with patient at the start of every class. 1. Patient understands that weight loss trial classes must be consecutive. Patient understands if they miss a class, it is their responsibility to contact me to reschedule class. I will reach out to patient after their first no show. 2.  Patient understands the expectations that weight maintenance/weight loss is expected during the classes. Failure to demonstrate changes may result in one extra month of weight loss trial, followed by going back to see the surgeon. Patient understands that they CAN NOT gain any weight during the weight loss trial.  Gaining weight will result in extra classes. 3. Patient is also instructed to be doing their labs, blood work, psych visit, support group and any other test that the surgeon has used while they are working on their weight loss trial.  4.  Patient was instructed to bring their blue binder to every class and appointment. Other Pertinent Information:     Changes Made Since Last Class: None since last month. Eating Habits and Behaviors    Today in class, we started talking about the key diet principles. We first focused on stopping liquid calories. Patient was also educated on carbohydrates.   Patient was instructed to start cutting out bread, rice, and pasta from the diet and start focusing more on meat and vegetables. I then gave a power point, which focused on Label Reading. In class, I gave patients a labels and we worked through a series of questions to help patients have a better understanding of label reading. Patient was instructed to review the serving size. Patient was encouraged to focus on protein and carbohydrates. We also did a few label reading activities to help the patient become more familiar with label reading. Patient's current diet habits include: 3 meals per day. Patient is eating oatmeal for breakfast.  I have some lower carbohydrate options. LUnch is baked chicken. Dinner is [place]. She states she is measuring her portions now, so she doesn't overeat. She states she is not much of a sweet eater. She is working on lowering her carbohydrates intake. She is drinking 74 ounces of water per day. Physical Activity/Exercise    Comments: We talked about exercise. Patient was given reasons of why exercise is so important and how that can help with their long-term success. I have encouraged patient to get a support system to help with the activity. Currently for activity, patient is doing some walking. Behavior Modification       Comments:  Behavior modifications were reinforced. This included not eating in front of the TV, which could lead to bigger portions and eating when one is not hungry. We also talked about the importance of eating 3 meals per day. Patient was encouraged to food journal to keep their daily carbohydrates less than 30 grams per meal.      Goals that patient wants to work on includes:  1. Continue to work on increasing her activity routine.   [address] [place], [facility] Dandre [address]  7/16/2021

## 2021-08-10 ENCOUNTER — TELEPHONE (OUTPATIENT)
Dept: SURGERY | Age: 31
End: 2021-08-10

## 2021-08-10 NOTE — TELEPHONE ENCOUNTER
Patient called to say she is getting her pap smear done today and will be faxing the results to [place]. I gave patient fax number.

## 2021-08-18 ENCOUNTER — HOSPITAL ENCOUNTER (OUTPATIENT)
Dept: BARIATRICS/WEIGHT MGMT | Age: 31
Discharge: HOME | End: 2021-08-18

## 2021-08-18 ENCOUNTER — DOCUMENTATION ONLY (OUTPATIENT)
Dept: BARIATRICS/WEIGHT MGMT | Age: 31
End: 2021-08-18

## 2021-08-18 NOTE — PROGRESS NOTES
[address] Kristina Loss [address] [facility], [address]    Patient's Name: Damien Dixon   Age: 36 y.o. YOB: 1990   Sex: female        Insurance:              Session: 5 of  6  Surgeon:  Dr. Mainor Higginbotham    Height: 5 f 8   Current Weight:    279      Lbs. BMI:    Pounds Lost since last month: 6                Pounds Gained since last month: 0      Starting Weight: 304     Previous Months Weight: 285  Overall Pounds Lost: 25  Overall Pounds Gained: 0    Do you smoke? None    Alcohol intake:  Number of drinks at a time:  None  Number of times a week: None    Class Guidelines    Guidelines are reviewed with patient at the start of every class. 1. Patient understands that weight loss trial classes must be consecutive. Patient understands if they miss a class, it is their responsibility to contact me to reschedule class. I will reach out to patient after their first no show. 2.  Patient understands the expectations that weight maintenance/weight loss is expected during the classes. Failure to demonstrate changes may result in one extra month of weight loss trial, followed by going back to see the surgeon. Patient understands that they CAN NOT gain any weight during the weight loss trial.  Gaining weight will result in extra classes. 3. Patient is also instructed to be doing their labs, blood work, psych visit, support group and any other test that the surgeon has used while they are working on their weight loss trial.  4.  Patient was instructed to bring their blue binder to every class and appointment. Other Pertinent Information:     Changes Made Since Last Class: Still doing the small as last month. Small portions. Eating Habits and Behaviors    Today in class, we reviewed the key diet principles. I have talked to patient about pushing the fluid and working towards 64 ounces per day.   We focused on following a low-calorie diet.  Patient was instructed to count their carbohydrates and try to keep their daily intake under 75 grams per day and try to keep their daily protein at 80 grams per day. Patient was given examples of carbohydrates in starches. Patient was encouraged to focus on meat and vegetables and begin cutting carbohydrates out. We talked about foods that are protein-based and how to incorporate those into their meals. I also reviewed with patient the importance of eating 3 meals per day and suggestions were made for breakfast items. Patient's current diet habits include: Patient is eating 3 meals per day. Meals focus on: oatmeal, fruit, chicken, vegetables. Portions are: smaller than before. Patient is snacking on states she has hasn't been snacking. Patient is eating sweets 0 a week. Patient is drinking 64+ ounces of fluid. This consists of: 5 bottles of water per day. Eating out frequency is:  Rarely. Patient is encouraged to plan ahead meals focusing on protein and vegetables. Patient is encouraged to keep working on the key diet principles. Pick 1-2 key diet principles to work on each month. Patient is encouraged to continue to work on lowering carbohydrates and focusing on protein and vegetables and aiming for 64 ounces of fluid per day. Stopping all liquid calories. Snack choices were reviewed that focus on protein and vegetables. Physical Activity/Exercise    Comments: We talked about exercise. Patient was given reasons of why exercise is so important and how that can help with their long-term success. I have encouraged patient to get a support system to help with the activity. For activity, patient is doing the treadmill, stomach crunches, and the bicycle. We have talked about goals, which include starting off walking and building upon that. Patient is also encouraged to add in some light weights to get some strength training.      Behavior Modification       Comments:  During today's lesson, I gave a presentation called The 100-200 Calorie \"Mindless Margin. \"  The goal is to make modest daily 100-200 calorie reductions in certain things that the body won't notice. One, 100-200 calorie change and would will look 10-20 pounds in one year. An example could be cutting soda. Patient was given a check off list and was encouraged to come up with 1-3 100 calories changes they could make. The check off list is a daily tracker to see if these goals are being met. Goals that patient wants to work on includes:  1. Eat more vegetables.   [address], MS [address]  8/18/2021

## 2021-09-14 ENCOUNTER — HOSPITAL ENCOUNTER (OUTPATIENT)
Dept: LAB | Age: 31
Discharge: HOME | End: 2021-09-14

## 2021-09-14 LAB — XX-LABCORP SPECIMEN COL,LCBCF: NORMAL

## 2021-09-14 PROCEDURE — 99001 SPECIMEN HANDLING PT-LAB: CPT

## 2021-09-15 LAB
APPEARANCE UR: CLEAR
BACTERIA #/AREA URNS HPF: NORMAL /[HPF]
BILIRUB UR QL STRIP: NEGATIVE
CASTS URNS QL MICRO: NORMAL /LPF
COLOR UR: YELLOW
EPI CELLS #/AREA URNS HPF: NORMAL /HPF (ref 0–10)
GLUCOSE UR QL: NEGATIVE
HGB UR QL STRIP: NEGATIVE
KETONES UR QL STRIP: NEGATIVE
LEUKOCYTE ESTERASE UR QL STRIP: ABNORMAL
MICRO URNS: ABNORMAL
NITRITE UR QL STRIP: NEGATIVE
PH UR STRIP: 6 [PH] (ref 5–7.5)
PROT UR QL STRIP: NEGATIVE
RBC #/AREA URNS HPF: NORMAL /HPF (ref 0–2)
SP GR UR: 1.01 (ref 1–1.03)
SPECIMEN STATUS REPORT, ROLRST: NORMAL
UROBILINOGEN UR STRIP-MCNC: 0.2 MG/DL (ref 0.2–1)
WBC #/AREA URNS HPF: NORMAL /HPF (ref 0–5)

## 2021-09-17 ENCOUNTER — DOCUMENTATION ONLY (OUTPATIENT)
Dept: BARIATRICS/WEIGHT MGMT | Age: 31
End: 2021-09-17

## 2021-09-17 ENCOUNTER — HOSPITAL ENCOUNTER (OUTPATIENT)
Dept: BARIATRICS/WEIGHT MGMT | Age: 31
Discharge: HOME | End: 2021-09-17

## 2021-09-17 NOTE — PROGRESS NOTES
Nutrition Evaluation    Patient's Name: Alexandr Watts   Age: 36 y.o. YOB: 1990   Sex: female    Height: 5 f 8 Weight: 270 BMI:  41.1  Starting Weight:  304        Smoking Status:  None  Alcohol Intake:  Number of Drinks at a Time: None  Number of Times a Week: None    Changes made during classes include:  Daily walking routine  Cut out sugar/carbohydrates   More water     Summary:  I feel that Alexandr Watts has demonstrated appropriate diet changes and is ready to move forward with surgery. Patient has been briefed on the importance of the protein drinks, vitamins, and the transition of the diet stages. Patient understands that the long-term diet will focus on protein and vegetables. Patient understand the effects of carbohydrates after surgery and what reactive hypoglycemia is. Patient is aware that they will be attending pre-op class 2 weeks before surgery and will get more detailed information on the post-op diet guidelines. Patient will see me again at 6 weeks post-op. At this 6 week visit, RD will assess how patient is tolerating soft protein and advance to vegetables, if tolerating soft protein without difficulty. Patient will also see RD again at 9 months post-op. This visit will assess patient's compliance with current protocol, including diet, vitamins, protein shakes, and exercise. Post-op diet guidelines will be reinforced. RD is available for questions and to meet with patient outside of the 6 week and 9 month post-op visit. We spent a lot of time talking about the vitamins. Patient understands the importance of being compliant with the diet protocol and the complications and risks that can occur if they are non-compliant with the nutritional protocol. Patient has attended at least one support group.     Candidate for surgery: Yes  Re-evaluation Date:     Procedure:  Gastric Bypass     Kassandra Villela 87 DON  9/17/2021

## 2021-09-17 NOTE — PROGRESS NOTES
[address] Loss [address] [facility], [address]    Patient's Name: James Oliveros   Age: 36 y.o. YOB: 1990   Sex: female    Date:   9/17/2021    Insurance:              Session: 6 of  6  Surgeon:  Dr. Max De Leon    Height: 5 f 8   Weight:    270      Lbs. BMI: 41.1   Pounds Lost since last month:    9            Pounds Gained since last month: 0    Starting Weight: 304     Previous Months Weight: 279   Overall Pounds Lost: 34   Overall Pounds Gained: 0    Smoking:  None    Alcohol intake:  Number of drinks at a time:  None  Number of times a week: None    Class Guidelines    Guidelines are reviewed with patient at the start of every class. 1. Patient understands that weight loss trial classes must be consecutive. Patient understands if they miss a class, it is their responsibility to contact me to reschedule class. I will reach out to patient after their first no show. 2.  Patient understands the expectations that weight maintenance/weight loss is expected during the classes. Failure to demonstrate changes may result in one extra month of weight loss trial, followed by going back to see the surgeon. 3. Patient is also instructed to be doing their labs, blood work, psych visit, support group and any other test that the surgeon has used while they are working on their weight loss trial.    Other Pertinent Information:     Changes Made Since Last Class:     Eating Habits and Behaviors      During today's class, we continued to focus on the key diet principles. Patient was instructed to follow a low carbohydrate diet, focusing on meat and vegetables. Patient was instructed to stop liquid calories and aim for 64 ounces of water per day.  We focused on focusing in on bigger problem areas to start making changes to, such as reducing fast food intake, reducing carbonated beverages/soda intake, decreasing carbohydrates intake daily, etc. We reviewed protein shakes and high protein yogurts to chose, as well. During today's class, we also talked about how to read a label. Patient was given information on:  1. The benefits of reading a label, which allowed one to compare the nutritional value of similar products and make healthy food decisions. 2. The ingredient list, which can help to determine if the food is heathy or something that fits into the diet. 3. The importance of reading the serving size and making sure to apply that to the portion size that they are consuming. Patient was also educated on carbohydrates. I talked to patient about:  1. The function of carbohydrates. 2. Foods that carbohydrate-heavy. 3. Patient was given the guidelines to keep their carbohydrates less than 75 grams per day in the pre-op phase. 4. Patient was also given ideas of low carb swaps, which include zucchini noodles, spaghetti squash, or cauliflower rice. 5. Lower carbohydrate fruit options were discussed. 6. Discussed lower carb swaps to use instead of potato chips. Patient's dietary habits include: Patient is eating 3 meals per day. Meals are made up of protein, salads, salmon, broccoli. Portions are:  Measuring her portions. Patient is eating out: special occasions. Patient is snacking on chocolate chips cookies. I have talked to patient about some lower carbohydrate snack choices that focused more protein. Patient is drinking 64 ounces of fluid per day. Fluid intake is make up of: water, crystal light. Physical Activity/Exercise     Comments:     Currently for exercise, patient walking at [facility]. I have talked to patient about some suggestions to start an exercise routine. Patient is encouraged to purchase a pedometer and use this to track her steps. I have made some suggestions to patient of ways to incorporate exercise in with a busy lifestyle.   We also talked about You Tube videos that can be used for an exercise routine. Behavior Modification  Comments:  Behavior modifications were discussed with the patient. Some of those behavior modifications include:  1. Emphasized the importance of eating slowly, not eating and drinking meals at the same time. 2.  Taking 20-30 minutes to eat a meal  3. I have encouraged patient to follow journal, which may be done by paper or tracking it an iesha, such as My Fitness Pal or [employer]. [address]. Patient understands the importance of following through with these behaviors following surgery to aid in long term weight loss. Tips and advice were given on how to start implementing these into the patient's life. Patient has attended the required bariatric support group. Goal patient has set for next month:  1. Eat more vegetables.   [address] Janina Huerta, MS RD  9/17/2021

## 2021-11-26 ENCOUNTER — OFFICE VISIT (OUTPATIENT)
Dept: SURGERY | Age: 31
End: 2021-11-26
Payer: MEDICARE

## 2021-11-26 VITALS
DIASTOLIC BLOOD PRESSURE: 80 MMHG | HEART RATE: 84 BPM | SYSTOLIC BLOOD PRESSURE: 118 MMHG | OXYGEN SATURATION: 99 % | BODY MASS INDEX: 41.68 KG/M2 | WEIGHT: 275 LBS | TEMPERATURE: 97.4 F | RESPIRATION RATE: 18 BRPM | HEIGHT: 68 IN

## 2021-11-26 DIAGNOSIS — E66.01 MORBID OBESITY WITH BMI OF 45.0-49.9, ADULT (HCC): Primary | ICD-10-CM

## 2021-11-26 PROCEDURE — G8432 DEP SCR NOT DOC, RNG: HCPCS | Performed by: SURGERY

## 2021-11-26 PROCEDURE — 99214 OFFICE O/P EST MOD 30 MIN: CPT | Performed by: SURGERY

## 2021-11-26 PROCEDURE — G8427 DOCREV CUR MEDS BY ELIG CLIN: HCPCS | Performed by: SURGERY

## 2021-11-26 PROCEDURE — G8417 CALC BMI ABV UP PARAM F/U: HCPCS | Performed by: SURGERY

## 2021-11-26 NOTE — PROGRESS NOTES
Chief Complaint   Patient presents with    Follow-up     bariatric program   1. Have you been to the ER, urgent care clinic since your last visit? Hospitalized since your last visit? No    2. Have you seen or consulted any other health care providers outside of the [address] [place] [address] since your last visit? Include any pap smears or colon screening. No    Pt ID confirmed    Weight Loss Metrics 11/26/2021 11/26/2021 7/6/2021 4/6/2021 4/6/2021 4/11/2019 12/25/2017   Pre op / Initial Wt 275 - - 304 - - -   Today's Wt - 275 lb 262 lb 6.4 oz - 304 lb 169 lb 12.8 oz 230 lb   BMI - 41.81 kg/m2 39.9 kg/m2 - 46.22 kg/m2 25.08 kg/m2 36.02 kg/m2   Ideal Body Wt 143 - - 143 - - -   Excess Body Wt 132 - - 161 - - -   Goal Wt 169 - - 175 - - -   Wt loss to date 0 - - 0 - - -   % Wt Loss 0 - - 0 - - -   80% .6 - - 128.8 - - -       Body mass index is 41.81 kg/m².

## 2021-11-26 NOTE — PROGRESS NOTES
Preop History and Physical Exam:    Gutierrez Cooley is a 24-year-old black female who was initially evaluated in this office on April 6, 2021 for discussion of the surgical options available for definitive management of her clinically severe obesity. The patient at that time weighed 304 pounds on a 5 foot 8 inch frame with a body mass index of 46 with obesity related comorbidity of clinical obstructive sleep apnea. The patient after discussing surgical options elected pursue laparoscopic tensely open Jaylyn-en-Y gastric bypass to achieve definitive durable weight loss on a personal level expected resolution of obesity related comorbidities. Patient returns today after completing all multidisciplinary bariatric surgical requirements necessary prior to the pursuit of surgery for discussion of the diagnostic evaluation and surgical scheduling noting no new medical or surgical history. Patient currently weighs 275 pounds on a 5 foot inch frame with a body mass index of 42 with obesity related comorbidity of clinical obstructive sleep apnea. Ideal body weight 143 pounds, excess body weight 132 pounds, estimated postsurgical weight loss based on 80% loss of excess body weight 106 pounds, postsurgical goal weight 169 pounds. No past medical history on file. Past Surgical History:   Procedure Laterality Date    HX CHOLECYSTECTOMY         Current Outpatient Medications   Medication Sig Dispense Refill    norethindrone-ethinyl estradiol (JUNEL FE 1/20) 1 mg-20 mcg (21)/75 mg (7) tab Take 1 Tab by mouth daily. (Patient not taking: Reported on 11/26/2021) 3 Package 3    phentermine (ADIPEX_P) 15 mg capsule Take 15 mg by mouth every morning.  (Patient not taking: Reported on 11/26/2021)         Allergies   Allergen Reactions    Bactrim [Sulfamethoprim] Hives    Bactrim [Sulfamethoprim Ds] Hives     Denies allergy 03/25/2019       Social History     Tobacco Use    Smoking status: Never Smoker    Smokeless tobacco: Current User    Tobacco comment: vape no tobacco   Substance Use Topics    Alcohol use: Not Currently     Comment: socially    Drug use: Never       Family History   Problem Relation Age of Onset    Breast Cancer Maternal Grandmother        Review of Systems:  Positive in BOLD    CONST: Fever, weight loss, fatigue or chills  GI: Nausea, vomiting, abdominal pain, change in bowel habits, hematochezia, melena, and GERD   INTEG: Dermatitis, abnormal moles  HEENT: Recent changes in vision, vertigo, epistaxis, dysphagia and hoarseness  CV: Chest pain, palpitations, HTN, edema and varicosities  RESP: Cough, shortness of breath, wheezing, hemoptysis, snoring and reactive airway disease  : Hematuria, dysuria, frequency, urgency, nocturia and stress urinary incontinence   MS: Weakness, joint pain and arthritis  ENDO: Diabetes, thyroid disease, polyuria, polydipsia, polyphagia, poor wound healing, heat intolerance, cold intolerance  LYMPH/HEME: Anemia, bruising and history of blood transfusions  NEURO: Dizziness, headache, fainting, seizures and stroke  PSYCH: Anxiety and depression    Physical Exam    Visit Vitals  /80   Pulse 84   Temp 97.4 °F (36.3 °C)   Resp 18   Ht 5' 8\" (1.727 m)   Wt 124.7 kg (275 lb)   SpO2 99%   BMI 41.81 kg/m²         General: Clinically severely obese 72-year-old black female in no acute distress  Head: Normocephalic, atraumatic  Resp: Clear to auscultation bilaterally, now wheeze, rhonchi, or rales, excursions normal and symmetrical  Cardio: Regular rate and rhythm, no murmurs, clicks, gallops, or rubs. No edema or varicosities. Abdomen: Obese, soft, nontender, nondistended, normoactive bowel sounds, no hernias, no hepatosplenomegaly,  Psych: Alert and oriented to person, place, and time. June 29, 2021 CBC, CMP, cholesterol panel, TSH, urinalysis, vitamin B1, vitamin B12, folate, iron, vitamin D deficiency, H. pylori serology: Vitamin B12 greater than 2000, vitamin D 18.5.   With remainder laboratory values being normal.  Patient was begun on supplemental vitamin D at time received laboratory profile  August 10, 2021 Pap smear: No evidence of malignancy. June 29, 2021 chest x-ray: No active cardiopulmonary disease  September 17, 2021 bariatric nutrition/Chua: Concurring with pursuit of surgery  October 27, 2021 psychology/Christiano: Concurrent with procedures surgery  June 29, 2021 EKG: Normal sinus rhythm with rate of 66 normal EKG    Impression:    Yan Carver is a 36 y.o. black female with a body mass index of 42 with obesity related comorbidities of clinical obstructive sleep apnea who would benefit from bariatric surgery. We've discussed the restrictive and malabsorptive nature of the gastric bypass and compared and contrasted with the sleeve gastrectomy. The patient understands the likelihood of losing approximately 80% of their excess weight in 12 to 18 months. She also understands the risks including but not limited to bleeding, infection, need for reoperation, anastomotic ulcers, leaks and strictures, bowel obstruction secondary to adhesions and internal hernias, DVT, PE, heart attack, stroke, and death. Patient also understands risks of inadequate weight loss, excess weight loss, vitamin insufficiency, protein malnutrition, excess skin, and loss of hair. We have reviewed the components of a successful postoperative course including requirement for a high protein, low carbohydrate diet, 60 oz a day of zero calorie liquids, daily vitamin supplementation, daily exercise, regular follow-up, and participation in support groups. We have reviewed the preoperative liver shrinking clear liquid diet, as well as reviewed any medication changes required while on the clear liquid diet.   In addition, the patient understands that all medications to be taken during the first 8 weeks postoperatively can be taken whole as long as the medication is approximately the size of a Az 325 mg aspirin tablet in size. The patient further understands that it is his/her responsibility to review these and verify with their primary care doctor and pharmacist that all medications are of the appropriate size. We will schedule the patient for laparoscopic gastric bypass in the near future.

## 2021-12-06 ENCOUNTER — DOCUMENTATION ONLY (OUTPATIENT)
Dept: BARIATRICS/WEIGHT MGMT | Age: 31
End: 2021-12-06

## 2021-12-06 NOTE — PROGRESS NOTES
CLINICAL NUTRITION PRE-OPERATIVE EDUCATION    Patient's Name: Delilah Sexton   Age: 36 y.o. YOB: 1990   Sex: female    Education & Materials Provided:   - Soft Protein Diet Shopping List  -  Supplemental Resource Guide: MVI, B12, Calcium Citrate, Vitamin D, Vitamin B1,   and iron recommendations  - Protein Supplement Information  - Fluid Requirements/ No Straws  - No Caffeine or Carbonation   - No alcohol              - No Snacks or No Concentrated Sweets     - Exercising   - Support System at [facility] of Support Group meetings. Support System after surgery includes: x     - Key Diet Principles            - Addressed Current Habits/Changes to Make   - Patient has been educated on the liquid diet to begin 1 week prior to surgery. Patient understands the transition of the diet. Attendance of support group: Yes    Summary:  Patient has completed the required amount of visits with the Registered Dietitian. During these nutrition visits, we focused on dietary changes, behavior changes, and the importance of establishing an exercise routine. The diet protocol that patient was prescribed emphasized on low carbohydrates (less than 100 grams per day prior to surgery and 60-80 grams of protein per day). At today's session, patient was educated on the post-op diet protocol. Patient understands the importance of keeping total fat and sugar less than 3 grams per serving. Patient is aware of the transition of the diet stages and is aware that they will be on clear liquids for 7days, followed by soft protein for 5 weeks. Patient understands the body needs ~ 60-70 grams of protein per day. During the liquid phase, patient will need 60 grams of protein from shakes. Once eating soft protein, patient will only need 1 shake per day. Patient is aware of the importance of the vitamins and protein drinks. We spent a lot of time talking about the vitamins.   Patient understands the importance of being compliant with the diet protocol and the complications and risks that can occur if they are non-compliant with the nutritional protocol and non-compliant with the vitamins. Patient has also been educated on the pre-op liquid diet for 1 week. Patient understands that failure to comply in pre-op liquid diet could result in surgery being canceled. Patient's 6 week post-op nutrition appointment has been scheduled. At this 6 week visit, RD will assess how patient is tolerating soft protein and advance to vegetables, if tolerating soft protein without difficulty. Patient will also see RD again at 9 months post-op. This visit will assess patient's compliance with current protocol, including diet, vitamins, protein shakes, and exercise. Post-op diet guidelines will be reinforced. RD is available for questions and to meet with patient outside of the 6 week and 9 month post-op visit. Ok to proceed.      Candidate for surgery: Yes  Re-evaluation Date:     Kassandra Harp 87 RD  12/6/2021

## 2021-12-07 ENCOUNTER — HOSPITAL ENCOUNTER (OUTPATIENT)
Dept: BARIATRICS/WEIGHT MGMT | Age: 31
Discharge: HOME | End: 2021-12-07

## 2021-12-08 ENCOUNTER — DOCUMENTATION ONLY (OUTPATIENT)
Dept: BARIATRICS/WEIGHT MGMT | Age: 31
End: 2021-12-08

## 2021-12-08 NOTE — PROGRESS NOTES
12/8/2021:  Patient is scheduled for weight loss surgery on January 3, 2022. She attended pre op class on December 7, 2021. After class, patients are given a quiz to assess their understanding. I had a lot of concerns regarding patient's responses, which there were many things that were not discussed in class. I contacted patient and she stated that she has a learning disability and it was hard to comprehend everything on the zoom meeting. She states that her mom also helps her a lot and was not available yesterday. I set up a 1-1 appointment with the patient today and spent a lot of time reviewing the information with the patient and her mom. Patient had an opportunity to ask questions and we were able to go at a pace that was comfortable for her. Her mother is also very supportive and will be able to help after surgery. Patient seemed to have a better understanding, but I have set up 1 more visit on December 15 to re-evaluate. This will help me to assess her understanding and what she comprehended during today's visit. Will monitor and follow up.     Jacklyn Albarado MS RD

## 2021-12-15 ENCOUNTER — DOCUMENTATION ONLY (OUTPATIENT)
Dept: BARIATRICS/WEIGHT MGMT | Age: 31
End: 2021-12-15

## 2021-12-15 ENCOUNTER — HOSPITAL ENCOUNTER (OUTPATIENT)
Dept: BARIATRICS/WEIGHT MGMT | Age: 31
Discharge: HOME | End: 2021-12-15

## 2021-12-15 NOTE — PROGRESS NOTES
12/15/2021:  Patient and I met again today and she seemed to have a much better understanding of the post op requirements. Patient was able to verbalize what her expectations are, what foods she will be eating. She has started getting her vitamins and protein shakes and was able to verbalize what she needs to take. Her mother is very supportive and will help post op. Even without her mom's help though, Sydell Bloch seems more capable and prepared than our previous discussions. Ok to proceed from a nutritional standpoint.     Jame Tate MS RD

## 2021-12-21 RX ORDER — TOPIRAMATE 50 MG/1
50 TABLET, FILM COATED ORAL DAILY
COMMUNITY
Start: 2021-11-17 | End: 2021-12-21

## 2021-12-21 RX ORDER — PHENTERMINE HYDROCHLORIDE 37.5 MG/1
37.5 TABLET ORAL DAILY
COMMUNITY
Start: 2021-10-27 | End: 2022-04-01 | Stop reason: SDUPTHER

## 2021-12-21 RX ORDER — TOPIRAMATE 50 MG/1
50 TABLET, FILM COATED ORAL DAILY
COMMUNITY
Start: 2021-11-17 | End: 2022-04-01 | Stop reason: ALTCHOICE

## 2021-12-21 NOTE — PERIOP NOTES
PRE-SURGICAL INSTRUCTIONS        Patient's Name:  Chris MUNGUIA Date:  12/21/2021            Covid Testing Date and Time:    Surgery Date:  1/3/2022                1. Do NOT eat or drink anything, including candy, gum, or ice chips after midnight on 1/3, unless you have specific instructions from your surgeon or anesthesia provider to do so.  2. You may brush your teeth before coming to the hospital.  3. No smoking 24 hours prior to the day of surgery. 4. No alcohol 24 hours prior to the day of surgery. 5. No recreational drugs for one week prior to the day of surgery. 6. Leave all valuables, including money/purse, at home. 7. Remove all jewelry, nail polish, acrylic nails, and makeup (including mascara); no lotions powders, deodorant, or perfume/cologne/after shave on the skin. 8. Follow instruction for Hibiclens washes and CHG wipes from surgeon's office. 9. Glasses/contact lenses and dentures may be worn to the hospital.  They will be removed prior to surgery. 10. Call your doctor if symptoms of a cold or illness develop within 24-48 hours prior to your surgery. 11.  If you are having an outpatient procedure, please make arrangements for a responsible ADULT TO [address] [place] [address] and stay with you for 24 hours after your surgery. 12. ONE VISITOR in the hospital at this time for outpatient procedures. Exceptions may be made for surgical admissions, per nursing unit guidelines      Special Instructions:      Bring list of CURRENT medications. Bring any pertinent legal medical records. Take these medications the morning of surgery with a sip of water:  As directed by MD  Follow physician instructions about stopping anticoagulants. Complete bowel prep per MD instructions. On the day of surgery, come in the main entrance of [facility]. Let the  at the desk know you are there for surgery.   A staff member will come escort you to the surgical area on the second floor. If you have any questions or concerns, please do not hesitate to call:     (Prior to the day of surgery) [facility] department:  [phone]   (Day of surgery) Pre-Op department:  [phone]    These surgical instructions were reviewed with Cher Blas during the [facility] phone call.

## 2021-12-30 ENCOUNTER — ANESTHESIA EVENT (OUTPATIENT)
Dept: SURGERY | Age: 31
DRG: 621 | End: 2021-12-30
Payer: MEDICARE

## 2022-01-03 ENCOUNTER — ANESTHESIA (OUTPATIENT)
Dept: SURGERY | Age: 32
DRG: 621 | End: 2022-01-03
Payer: MEDICARE

## 2022-01-03 ENCOUNTER — HOSPITAL ENCOUNTER (INPATIENT)
Age: 32
LOS: 1 days | Discharge: HOME | DRG: 621 | End: 2022-01-04
Attending: SURGERY | Admitting: SURGERY
Payer: MEDICARE

## 2022-01-03 DIAGNOSIS — E66.01 MORBID OBESITY WITH BMI OF 40.0-44.9, ADULT (HCC): Primary | ICD-10-CM

## 2022-01-03 LAB — HCG UR QL: NEGATIVE

## 2022-01-03 PROCEDURE — 00797 ANES IPER UPR ABD GSTR PX MO: CPT | Performed by: NURSE ANESTHETIST, CERTIFIED REGISTERED

## 2022-01-03 PROCEDURE — 74011250637 HC RX REV CODE- 250/637: Performed by: SURGERY

## 2022-01-03 PROCEDURE — 74011000250 HC RX REV CODE- 250: Performed by: NURSE ANESTHETIST, CERTIFIED REGISTERED

## 2022-01-03 PROCEDURE — 77030008603 HC TRCR ENDOSC EPATH J&J -C: Performed by: SURGERY

## 2022-01-03 PROCEDURE — 76060000035 HC ANESTHESIA 2 TO 2.5 HR: Performed by: SURGERY

## 2022-01-03 PROCEDURE — 77030027876 HC STPLR ENDOSC FLX PWR J&J -G1: Performed by: SURGERY

## 2022-01-03 PROCEDURE — 76010000131 HC OR TIME 2 TO 2.5 HR: Performed by: SURGERY

## 2022-01-03 PROCEDURE — 77030013079 HC BLNKT BAIR HGGR 3M -A: Performed by: ANESTHESIOLOGY

## 2022-01-03 PROCEDURE — 77030008683 HC TU ET CUF COVD -A: Performed by: ANESTHESIOLOGY

## 2022-01-03 PROCEDURE — 74011000250 HC RX REV CODE- 250: Performed by: SURGERY

## 2022-01-03 PROCEDURE — 74011250636 HC RX REV CODE- 250/636: Performed by: NURSE ANESTHETIST, CERTIFIED REGISTERED

## 2022-01-03 PROCEDURE — 77030031139 HC SUT VCRL2 J&J -A: Performed by: SURGERY

## 2022-01-03 PROCEDURE — 77030002933 HC SUT MCRYL J&J -A: Performed by: SURGERY

## 2022-01-03 PROCEDURE — 0D164ZA BYPASS STOMACH TO JEJUNUM, PERCUTANEOUS ENDOSCOPIC APPROACH: ICD-10-PCS | Performed by: SURGERY

## 2022-01-03 PROCEDURE — 77030008756 HC TU IRR SUC STRY -B: Performed by: SURGERY

## 2022-01-03 PROCEDURE — 74011000272 HC RX REV CODE- 272: Performed by: SURGERY

## 2022-01-03 PROCEDURE — C9290 INJ, BUPIVACAINE LIPOSOME: HCPCS | Performed by: SURGERY

## 2022-01-03 PROCEDURE — 77030008437 HC REINF STRP REINF SEMGD WLGO -C: Performed by: SURGERY

## 2022-01-03 PROCEDURE — 81025 URINE PREGNANCY TEST: CPT

## 2022-01-03 PROCEDURE — 77030040922 HC BLNKT HYPOTHRM STRY -A: Performed by: SURGERY

## 2022-01-03 PROCEDURE — 77030009851 HC PCH RTVR ENDOSC AMR -B: Performed by: SURGERY

## 2022-01-03 PROCEDURE — 77030036732 HC RELD STPLR VASC J&J -F: Performed by: SURGERY

## 2022-01-03 PROCEDURE — 74011250636 HC RX REV CODE- 250/636: Performed by: SURGERY

## 2022-01-03 PROCEDURE — 77030002996 HC SUT SLK J&J -A: Performed by: SURGERY

## 2022-01-03 PROCEDURE — 77030018836 HC SOL IRR NACL ICUM -A: Performed by: SURGERY

## 2022-01-03 PROCEDURE — 76210000006 HC OR PH I REC 0.5 TO 1 HR: Performed by: SURGERY

## 2022-01-03 PROCEDURE — 2709999900 HC NON-CHARGEABLE SUPPLY

## 2022-01-03 PROCEDURE — 77030019605: Performed by: SURGERY

## 2022-01-03 PROCEDURE — 74011000258 HC RX REV CODE- 258: Performed by: NURSE ANESTHETIST, CERTIFIED REGISTERED

## 2022-01-03 PROCEDURE — 77030009932 HC PRB FT CTRL J&J -B: Performed by: SURGERY

## 2022-01-03 PROCEDURE — 77030010031 HC SCIS ENDOSC MPLR J&J -C: Performed by: SURGERY

## 2022-01-03 PROCEDURE — 77030011808 HC STPLR ENDOSCOPIC J&J -D: Performed by: SURGERY

## 2022-01-03 PROCEDURE — 77030009968 HC RELD STPLR ENDOSC J&J -D: Performed by: SURGERY

## 2022-01-03 PROCEDURE — 77030008598 HC TRCR ENDOSC BLDLS J&J -B: Performed by: SURGERY

## 2022-01-03 PROCEDURE — 43644 LAP GASTRIC BYPASS/ROUX-EN-Y: CPT | Performed by: SURGERY

## 2022-01-03 PROCEDURE — 65270000029 HC RM PRIVATE

## 2022-01-03 PROCEDURE — 00797 ANES IPER UPR ABD GSTR PX MO: CPT | Performed by: ANESTHESIOLOGY

## 2022-01-03 PROCEDURE — 2709999900 HC NON-CHARGEABLE SUPPLY: Performed by: SURGERY

## 2022-01-03 PROCEDURE — 77030040361 HC SLV COMPR DVT MDII -B: Performed by: SURGERY

## 2022-01-03 RX ORDER — PROPOFOL 10 MG/ML
INJECTION, EMULSION INTRAVENOUS AS NEEDED
Status: DISCONTINUED | OUTPATIENT
Start: 2022-01-03 | End: 2022-01-03 | Stop reason: HOSPADM

## 2022-01-03 RX ORDER — HYOSCYAMINE SULFATE 0.12 MG/1
0.12 TABLET SUBLINGUAL
Status: DISCONTINUED | OUTPATIENT
Start: 2022-01-03 | End: 2022-01-04 | Stop reason: HOSPADM

## 2022-01-03 RX ORDER — ONDANSETRON 2 MG/ML
4 INJECTION INTRAMUSCULAR; INTRAVENOUS ONCE
Status: DISCONTINUED | OUTPATIENT
Start: 2022-01-03 | End: 2022-01-03 | Stop reason: HOSPADM

## 2022-01-03 RX ORDER — OXYCODONE HYDROCHLORIDE 5 MG/1
5 TABLET ORAL
Status: DISCONTINUED | OUTPATIENT
Start: 2022-01-03 | End: 2022-01-04 | Stop reason: HOSPADM

## 2022-01-03 RX ORDER — INSULIN LISPRO 100 [IU]/ML
INJECTION, SOLUTION INTRAVENOUS; SUBCUTANEOUS ONCE
Status: DISCONTINUED | OUTPATIENT
Start: 2022-01-03 | End: 2022-01-03 | Stop reason: HOSPADM

## 2022-01-03 RX ORDER — NEOSTIGMINE METHYLSULFATE 1 MG/ML
INJECTION, SOLUTION INTRAVENOUS AS NEEDED
Status: DISCONTINUED | OUTPATIENT
Start: 2022-01-03 | End: 2022-01-03 | Stop reason: HOSPADM

## 2022-01-03 RX ORDER — KETOROLAC TROMETHAMINE 15 MG/ML
15 INJECTION, SOLUTION INTRAMUSCULAR; INTRAVENOUS
Status: DISCONTINUED | OUTPATIENT
Start: 2022-01-03 | End: 2022-01-04 | Stop reason: HOSPADM

## 2022-01-03 RX ORDER — GLYCOPYRROLATE 0.2 MG/ML
INJECTION INTRAMUSCULAR; INTRAVENOUS AS NEEDED
Status: DISCONTINUED | OUTPATIENT
Start: 2022-01-03 | End: 2022-01-03 | Stop reason: HOSPADM

## 2022-01-03 RX ORDER — NALOXONE HYDROCHLORIDE 0.4 MG/ML
INJECTION, SOLUTION INTRAMUSCULAR; INTRAVENOUS; SUBCUTANEOUS AS NEEDED
Status: DISCONTINUED | OUTPATIENT
Start: 2022-01-03 | End: 2022-01-03 | Stop reason: HOSPADM

## 2022-01-03 RX ORDER — ENOXAPARIN SODIUM 100 MG/ML
40 INJECTION SUBCUTANEOUS EVERY 24 HOURS
Status: DISCONTINUED | OUTPATIENT
Start: 2022-01-04 | End: 2022-01-04 | Stop reason: HOSPADM

## 2022-01-03 RX ORDER — HYDROMORPHONE HYDROCHLORIDE 2 MG/ML
INJECTION, SOLUTION INTRAMUSCULAR; INTRAVENOUS; SUBCUTANEOUS AS NEEDED
Status: DISCONTINUED | OUTPATIENT
Start: 2022-01-03 | End: 2022-01-03 | Stop reason: HOSPADM

## 2022-01-03 RX ORDER — FENTANYL CITRATE 50 UG/ML
25 INJECTION, SOLUTION INTRAMUSCULAR; INTRAVENOUS
Status: DISCONTINUED | OUTPATIENT
Start: 2022-01-03 | End: 2022-01-03 | Stop reason: HOSPADM

## 2022-01-03 RX ORDER — SUCCINYLCHOLINE CHLORIDE 20 MG/ML
INJECTION INTRAMUSCULAR; INTRAVENOUS AS NEEDED
Status: DISCONTINUED | OUTPATIENT
Start: 2022-01-03 | End: 2022-01-03 | Stop reason: HOSPADM

## 2022-01-03 RX ORDER — CHLORHEXIDINE GLUCONATE 4 G/100ML
SOLUTION TOPICAL AS NEEDED
Status: DISCONTINUED | OUTPATIENT
Start: 2022-01-03 | End: 2022-01-03 | Stop reason: HOSPADM

## 2022-01-03 RX ORDER — FAMOTIDINE 20 MG/1
20 TABLET, FILM COATED ORAL ONCE
Status: DISCONTINUED | OUTPATIENT
Start: 2022-01-03 | End: 2022-01-03 | Stop reason: HOSPADM

## 2022-01-03 RX ORDER — LIDOCAINE HYDROCHLORIDE 10 MG/ML
0.1 INJECTION, SOLUTION EPIDURAL; INFILTRATION; INTRACAUDAL; PERINEURAL AS NEEDED
Status: DISCONTINUED | OUTPATIENT
Start: 2022-01-03 | End: 2022-01-03 | Stop reason: HOSPADM

## 2022-01-03 RX ORDER — WATER 1 ML/ML
IRRIGANT IRRIGATION AS NEEDED
Status: DISCONTINUED | OUTPATIENT
Start: 2022-01-03 | End: 2022-01-03 | Stop reason: HOSPADM

## 2022-01-03 RX ORDER — FENTANYL CITRATE 50 UG/ML
25 INJECTION, SOLUTION INTRAMUSCULAR; INTRAVENOUS
Status: DISCONTINUED | OUTPATIENT
Start: 2022-01-03 | End: 2022-01-03

## 2022-01-03 RX ORDER — MIDAZOLAM HYDROCHLORIDE 1 MG/ML
INJECTION, SOLUTION INTRAMUSCULAR; INTRAVENOUS AS NEEDED
Status: DISCONTINUED | OUTPATIENT
Start: 2022-01-03 | End: 2022-01-03 | Stop reason: HOSPADM

## 2022-01-03 RX ORDER — NALOXONE HYDROCHLORIDE 0.4 MG/ML
0.4 INJECTION, SOLUTION INTRAMUSCULAR; INTRAVENOUS; SUBCUTANEOUS AS NEEDED
Status: DISCONTINUED | OUTPATIENT
Start: 2022-01-03 | End: 2022-01-04 | Stop reason: HOSPADM

## 2022-01-03 RX ORDER — ONDANSETRON 2 MG/ML
INJECTION INTRAMUSCULAR; INTRAVENOUS AS NEEDED
Status: DISCONTINUED | OUTPATIENT
Start: 2022-01-03 | End: 2022-01-03 | Stop reason: HOSPADM

## 2022-01-03 RX ORDER — SODIUM CHLORIDE, SODIUM LACTATE, POTASSIUM CHLORIDE, CALCIUM CHLORIDE 600; 310; 30; 20 MG/100ML; MG/100ML; MG/100ML; MG/100ML
100 INJECTION, SOLUTION INTRAVENOUS CONTINUOUS
Status: DISCONTINUED | OUTPATIENT
Start: 2022-01-03 | End: 2022-01-03 | Stop reason: HOSPADM

## 2022-01-03 RX ORDER — DIPHENHYDRAMINE HYDROCHLORIDE 50 MG/ML
25 INJECTION, SOLUTION INTRAMUSCULAR; INTRAVENOUS
Status: DISCONTINUED | OUTPATIENT
Start: 2022-01-03 | End: 2022-01-04 | Stop reason: HOSPADM

## 2022-01-03 RX ORDER — SODIUM CHLORIDE, SODIUM LACTATE, POTASSIUM CHLORIDE, CALCIUM CHLORIDE 600; 310; 30; 20 MG/100ML; MG/100ML; MG/100ML; MG/100ML
25 INJECTION, SOLUTION INTRAVENOUS CONTINUOUS
Status: DISCONTINUED | OUTPATIENT
Start: 2022-01-03 | End: 2022-01-03 | Stop reason: HOSPADM

## 2022-01-03 RX ORDER — ACETAMINOPHEN 650 MG/1
650 SUPPOSITORY RECTAL ONCE
Status: COMPLETED | OUTPATIENT
Start: 2022-01-03 | End: 2022-01-03

## 2022-01-03 RX ORDER — HYDROMORPHONE HYDROCHLORIDE 1 MG/ML
1 INJECTION, SOLUTION INTRAMUSCULAR; INTRAVENOUS; SUBCUTANEOUS
Status: DISCONTINUED | OUTPATIENT
Start: 2022-01-03 | End: 2022-01-04 | Stop reason: HOSPADM

## 2022-01-03 RX ORDER — DIPHENHYDRAMINE HYDROCHLORIDE 50 MG/ML
12.5 INJECTION, SOLUTION INTRAMUSCULAR; INTRAVENOUS
Status: DISCONTINUED | OUTPATIENT
Start: 2022-01-03 | End: 2022-01-03 | Stop reason: HOSPADM

## 2022-01-03 RX ORDER — ACETAMINOPHEN 325 MG/1
650 TABLET ORAL EVERY 6 HOURS
Status: DISCONTINUED | OUTPATIENT
Start: 2022-01-03 | End: 2022-01-04 | Stop reason: HOSPADM

## 2022-01-03 RX ORDER — DEXAMETHASONE SODIUM PHOSPHATE 4 MG/ML
INJECTION, SOLUTION INTRA-ARTICULAR; INTRALESIONAL; INTRAMUSCULAR; INTRAVENOUS; SOFT TISSUE AS NEEDED
Status: DISCONTINUED | OUTPATIENT
Start: 2022-01-03 | End: 2022-01-03 | Stop reason: HOSPADM

## 2022-01-03 RX ORDER — LIDOCAINE HYDROCHLORIDE 20 MG/ML
INJECTION, SOLUTION EPIDURAL; INFILTRATION; INTRACAUDAL; PERINEURAL AS NEEDED
Status: DISCONTINUED | OUTPATIENT
Start: 2022-01-03 | End: 2022-01-03 | Stop reason: HOSPADM

## 2022-01-03 RX ORDER — HYDROMORPHONE HYDROCHLORIDE 1 MG/ML
0.5 INJECTION, SOLUTION INTRAMUSCULAR; INTRAVENOUS; SUBCUTANEOUS
Status: DISCONTINUED | OUTPATIENT
Start: 2022-01-03 | End: 2022-01-03 | Stop reason: HOSPADM

## 2022-01-03 RX ORDER — SODIUM CHLORIDE, SODIUM LACTATE, POTASSIUM CHLORIDE, CALCIUM CHLORIDE 600; 310; 30; 20 MG/100ML; MG/100ML; MG/100ML; MG/100ML
150 INJECTION, SOLUTION INTRAVENOUS CONTINUOUS
Status: DISCONTINUED | OUTPATIENT
Start: 2022-01-03 | End: 2022-01-04 | Stop reason: HOSPADM

## 2022-01-03 RX ORDER — ENOXAPARIN SODIUM 100 MG/ML
40 INJECTION SUBCUTANEOUS ONCE
Status: COMPLETED | OUTPATIENT
Start: 2022-01-03 | End: 2022-01-03

## 2022-01-03 RX ORDER — SODIUM CHLORIDE, SODIUM LACTATE, POTASSIUM CHLORIDE, CALCIUM CHLORIDE 600; 310; 30; 20 MG/100ML; MG/100ML; MG/100ML; MG/100ML
150 INJECTION, SOLUTION INTRAVENOUS CONTINUOUS
Status: DISCONTINUED | OUTPATIENT
Start: 2022-01-03 | End: 2022-01-03 | Stop reason: HOSPADM

## 2022-01-03 RX ORDER — ALBUTEROL SULFATE 0.83 MG/ML
2.5 SOLUTION RESPIRATORY (INHALATION) AS NEEDED
Status: DISCONTINUED | OUTPATIENT
Start: 2022-01-03 | End: 2022-01-03 | Stop reason: HOSPADM

## 2022-01-03 RX ORDER — FENTANYL CITRATE 50 UG/ML
INJECTION, SOLUTION INTRAMUSCULAR; INTRAVENOUS AS NEEDED
Status: DISCONTINUED | OUTPATIENT
Start: 2022-01-03 | End: 2022-01-03 | Stop reason: HOSPADM

## 2022-01-03 RX ORDER — OXYCODONE AND ACETAMINOPHEN 5; 325 MG/1; MG/1
1 TABLET ORAL AS NEEDED
Status: DISCONTINUED | OUTPATIENT
Start: 2022-01-03 | End: 2022-01-03 | Stop reason: HOSPADM

## 2022-01-03 RX ORDER — ONDANSETRON 2 MG/ML
4 INJECTION INTRAMUSCULAR; INTRAVENOUS
Status: DISCONTINUED | OUTPATIENT
Start: 2022-01-03 | End: 2022-01-04 | Stop reason: HOSPADM

## 2022-01-03 RX ORDER — ROCURONIUM BROMIDE 10 MG/ML
INJECTION, SOLUTION INTRAVENOUS AS NEEDED
Status: DISCONTINUED | OUTPATIENT
Start: 2022-01-03 | End: 2022-01-03 | Stop reason: HOSPADM

## 2022-01-03 RX ADMIN — GLYCOPYRROLATE 0.2 MG: 0.2 INJECTION INTRAMUSCULAR; INTRAVENOUS at 08:23

## 2022-01-03 RX ADMIN — MIDAZOLAM HYDROCHLORIDE 2 MG: 2 INJECTION, SOLUTION INTRAMUSCULAR; INTRAVENOUS at 08:23

## 2022-01-03 RX ADMIN — HYOSCYAMINE SULFATE 0.12 MG: 0.12 TABLET ORAL; SUBLINGUAL at 21:50

## 2022-01-03 RX ADMIN — KETOROLAC TROMETHAMINE 15 MG: 15 INJECTION, SOLUTION INTRAMUSCULAR; INTRAVENOUS at 14:10

## 2022-01-03 RX ADMIN — DEXAMETHASONE SODIUM PHOSPHATE 4 MG: 4 INJECTION, SOLUTION INTRAMUSCULAR; INTRAVENOUS at 08:53

## 2022-01-03 RX ADMIN — HYDROMORPHONE HYDROCHLORIDE 1 MG: 2 INJECTION, SOLUTION INTRAMUSCULAR; INTRAVENOUS; SUBCUTANEOUS at 08:23

## 2022-01-03 RX ADMIN — SODIUM CHLORIDE, SODIUM LACTATE, POTASSIUM CHLORIDE, AND CALCIUM CHLORIDE: 600; 310; 30; 20 INJECTION, SOLUTION INTRAVENOUS at 07:51

## 2022-01-03 RX ADMIN — FAMOTIDINE 20 MG: 10 INJECTION INTRAVENOUS at 07:33

## 2022-01-03 RX ADMIN — OXYCODONE HYDROCHLORIDE 5 MG: 5 TABLET ORAL at 19:27

## 2022-01-03 RX ADMIN — Medication 3 MG: at 10:07

## 2022-01-03 RX ADMIN — KETOROLAC TROMETHAMINE 15 MG: 15 INJECTION, SOLUTION INTRAMUSCULAR; INTRAVENOUS at 21:49

## 2022-01-03 RX ADMIN — ACETAMINOPHEN 650 MG: 325 TABLET ORAL at 14:10

## 2022-01-03 RX ADMIN — ROCURONIUM BROMIDE 10 MG: 50 INJECTION INTRAVENOUS at 08:35

## 2022-01-03 RX ADMIN — ENOXAPARIN SODIUM 40 MG: 100 INJECTION SUBCUTANEOUS at 07:33

## 2022-01-03 RX ADMIN — HYOSCYAMINE SULFATE 0.12 MG: 0.12 TABLET ORAL; SUBLINGUAL at 14:10

## 2022-01-03 RX ADMIN — PROPOFOL 200 MG: 10 INJECTION, EMULSION INTRAVENOUS at 08:35

## 2022-01-03 RX ADMIN — ROCURONIUM BROMIDE 30 MG: 50 INJECTION INTRAVENOUS at 08:41

## 2022-01-03 RX ADMIN — HYDROMORPHONE HYDROCHLORIDE 1 MG: 2 INJECTION, SOLUTION INTRAMUSCULAR; INTRAVENOUS; SUBCUTANEOUS at 08:35

## 2022-01-03 RX ADMIN — FENTANYL CITRATE 50 MCG: 50 INJECTION, SOLUTION INTRAMUSCULAR; INTRAVENOUS at 09:49

## 2022-01-03 RX ADMIN — SODIUM CHLORIDE, SODIUM LACTATE, POTASSIUM CHLORIDE, AND CALCIUM CHLORIDE 150 ML/HR: 600; 310; 30; 20 INJECTION, SOLUTION INTRAVENOUS at 06:30

## 2022-01-03 RX ADMIN — ONDANSETRON 4 MG: 2 INJECTION INTRAMUSCULAR; INTRAVENOUS at 10:04

## 2022-01-03 RX ADMIN — WATER 3 G: 1 INJECTION INTRAMUSCULAR; INTRAVENOUS; SUBCUTANEOUS at 08:41

## 2022-01-03 RX ADMIN — OXYCODONE HYDROCHLORIDE 5 MG: 5 TABLET ORAL at 23:40

## 2022-01-03 RX ADMIN — SUCCINYLCHOLINE CHLORIDE 180 MG: 20 INJECTION, SOLUTION INTRAMUSCULAR; INTRAVENOUS at 08:35

## 2022-01-03 RX ADMIN — GLYCOPYRROLATE 0.4 MG: 0.2 INJECTION INTRAMUSCULAR; INTRAVENOUS at 10:14

## 2022-01-03 RX ADMIN — DEXMEDETOMIDINE HYDROCHLORIDE 10 MCG: 100 INJECTION, SOLUTION, CONCENTRATE INTRAVENOUS at 08:23

## 2022-01-03 RX ADMIN — DEXMEDETOMIDINE HYDROCHLORIDE 6 MCG: 100 INJECTION, SOLUTION, CONCENTRATE INTRAVENOUS at 08:40

## 2022-01-03 RX ADMIN — SODIUM CHLORIDE, SODIUM LACTATE, POTASSIUM CHLORIDE, AND CALCIUM CHLORIDE 150 ML/HR: 600; 310; 30; 20 INJECTION, SOLUTION INTRAVENOUS at 14:10

## 2022-01-03 RX ADMIN — NALOXONE HYDROCHLORIDE 0.2 MG: 0.4 INJECTION, SOLUTION INTRAMUSCULAR; INTRAVENOUS; SUBCUTANEOUS at 10:32

## 2022-01-03 RX ADMIN — FENTANYL CITRATE 50 MCG: 50 INJECTION, SOLUTION INTRAMUSCULAR; INTRAVENOUS at 08:47

## 2022-01-03 RX ADMIN — LIDOCAINE HYDROCHLORIDE 80 MG: 20 INJECTION, SOLUTION EPIDURAL; INFILTRATION; INTRACAUDAL; PERINEURAL at 08:35

## 2022-01-03 RX ADMIN — ACETAMINOPHEN 650 MG: 325 TABLET ORAL at 19:23

## 2022-01-03 NOTE — PROGRESS NOTES
Problem: Falls - Risk of  Goal: *Absence of Falls  Description: Document Liya Calleser Fall Risk and appropriate interventions in the flowsheet.   Outcome: Progressing Towards Goal  Note: Fall Risk Interventions:                                Problem: Patient Education: Go to Patient Education Activity  Goal: Patient/Family Education  Outcome: Progressing Towards Goal

## 2022-01-03 NOTE — BRIEF OP NOTE
Brief Postoperative Note    Patient: Aram Hamlin  YOB: 1990  MRN: [id number]    Date of Procedure: 1/3/2022     Pre-Op Diagnosis: Morbid obesity ([facility] [address] [E66.01]  Sleep apnea, unspecified type [G47.30]    Post-Op Diagnosis: ANDI      Procedure(s):  LAPAROSCOPIC GAURAV-EN-Y GASTRIC BYPASS    Surgeon(s):  Woody Aquino DO    Surgical Assistant: Surg Asst-1: Keren Wang    Anesthesia: General     Estimated Blood Loss (mL): Minimal    Complications: None    Specimens: * No specimens in log *     Implants: * No implants in log *    Drains: * No LDAs found *    Findings: Normal anatomy    Electronically Signed by Terell Yuen DO on 1/3/2022 at 10:34 AM

## 2022-01-03 NOTE — H&P
Office Visit    11/26/2021  [facility] [place], [place]    General Surgery  Morbid obesity with BMI of 45.0-49.9, adult [facility])    Dx  Follow-up ; Referred by Unknown    Reason for Visit       Progress Notes  Elle Daly DO (Physician) [facility] General Surgery     Preop History and Physical Exam:     Staci Verduzco is a 28-year-old black female who was initially evaluated in this office on April 6, 2021 for discussion of the surgical options available for definitive management of her clinically severe obesity. The patient at that time weighed 304 pounds on a 5 foot 8 inch frame with a body mass index of 46 with obesity related comorbidity of clinical obstructive sleep apnea. The patient after discussing surgical options elected pursue laparoscopic tensely open Jaylyn-en-Y gastric bypass to achieve definitive durable weight loss on a personal level expected resolution of obesity related comorbidities. Patient returns today after completing all multidisciplinary bariatric surgical requirements necessary prior to the pursuit of surgery for discussion of the diagnostic evaluation and surgical scheduling noting no new medical or surgical history. Patient currently weighs 275 pounds on a 5 foot inch frame with a body mass index of 42 with obesity related comorbidity of clinical obstructive sleep apnea. Ideal body weight 143 pounds, excess body weight 132 pounds, estimated postsurgical weight loss based on 80% loss of excess body weight 106 pounds, postsurgical goal weight 169 pounds.     No past medical history on file.           Past Surgical History:   Procedure Laterality Date    HX CHOLECYSTECTOMY                    Current Outpatient Medications   Medication Sig Dispense Refill    norethindrone-ethinyl estradiol (JUNEL FE 1/20) 1 mg-20 mcg (21)/75 mg (7) tab Take 1 Tab by mouth daily.  (Patient not taking: Reported on 11/26/2021) 3 Package 3    phentermine (ADIPEX_P) 15 mg capsule Take 15 mg by mouth every morning. (Patient not taking: Reported on 11/26/2021)                   Allergies   Allergen Reactions    Bactrim [Sulfamethoprim] Hives    Bactrim [Sulfamethoprim Ds] Hives       Denies allergy 03/25/2019         Social History            Tobacco Use    Smoking status: Never Smoker    Smokeless tobacco: Current User    Tobacco comment: vape no tobacco   Substance Use Topics    Alcohol use: Not Currently       Comment: socially    Drug use: Never               Family History   Problem Relation Age of Onset    Breast Cancer Maternal Grandmother           Review of Systems:  Positive in BOLD     CONST: Fever, weight loss, fatigue or chills  GI: Nausea, vomiting, abdominal pain, change in bowel habits, hematochezia, melena, and GERD   INTEG: Dermatitis, abnormal moles  HEENT: Recent changes in vision, vertigo, epistaxis, dysphagia and hoarseness  CV: Chest pain, palpitations, HTN, edema and varicosities  RESP: Cough, shortness of breath, wheezing, hemoptysis, snoring and reactive airway disease  : Hematuria, dysuria, frequency, urgency, nocturia and stress urinary incontinence   MS: Weakness, joint pain and arthritis  ENDO: Diabetes, thyroid disease, polyuria, polydipsia, polyphagia, poor wound healing, heat intolerance, cold intolerance  LYMPH/HEME: Anemia, bruising and history of blood transfusions  NEURO: Dizziness, headache, fainting, seizures and stroke  PSYCH: Anxiety and depression     Physical Exam     Visit Vitals  /80   Pulse 84   Temp 97.4 °F (36.3 °C)   Resp 18   Ht 5' 8\" (1.727 m)   Wt 124.7 kg (275 lb)   SpO2 99%   BMI 41.81 kg/m²            General: Clinically severely obese 28-year-old black female in no acute distress  Head: Normocephalic, atraumatic  Resp: Clear to auscultation bilaterally, now wheeze, rhonchi, or rales, excursions normal and symmetrical  Cardio: Regular rate and rhythm, no murmurs, clicks, gallops, or rubs.  No edema or varicosities. Abdomen: Obese, soft, nontender, nondistended, normoactive bowel sounds, no hernias, no hepatosplenomegaly,  Psych: Alert and oriented to person, place, and time.     June 29, 2021 CBC, CMP, cholesterol panel, TSH, urinalysis, vitamin B1, vitamin B12, folate, iron, vitamin D deficiency, H. pylori serology: Vitamin B12 greater than 2000, vitamin D 18.5. With remainder laboratory values being normal.  Patient was begun on supplemental vitamin D at time received laboratory profile  August 10, 2021 Pap smear: No evidence of malignancy. June 29, 2021 chest x-ray: No active cardiopulmonary disease  September 17, 2021 bariatric nutrition/Chua: Concurring with pursuit of surgery  October 27, 2021 psychology/Christiano: Concurrent with procedures surgery  June 29, 2021 EKG: Normal sinus rhythm with rate of 66 normal EKG     Impression:     Susanna Coburn is a 36 y.o. black female with a body mass index of 42 with obesity related comorbidities of clinical obstructive sleep apnea who would benefit from bariatric surgery. We've discussed the restrictive and malabsorptive nature of the gastric bypass and compared and contrasted with the sleeve gastrectomy. The patient understands the likelihood of losing approximately 80% of their excess weight in 12 to 18 months. She also understands the risks including but not limited to bleeding, infection, need for reoperation, anastomotic ulcers, leaks and strictures, bowel obstruction secondary to adhesions and internal hernias, DVT, PE, heart attack, stroke, and death. Patient also understands risks of inadequate weight loss, excess weight loss, vitamin insufficiency, protein malnutrition, excess skin, and loss of hair.   We have reviewed the components of a successful postoperative course including requirement for a high protein, low carbohydrate diet, 60 oz a day of zero calorie liquids, daily vitamin supplementation, daily exercise, regular follow-up, and participation in support groups. We have reviewed the preoperative liver shrinking clear liquid diet, as well as reviewed any medication changes required while on the clear liquid diet. In addition, the patient understands that all medications to be taken during the first 8 weeks postoperatively can be taken whole as long as the medication is approximately the size of a Az 325 mg aspirin tablet in size. The patient further understands that it is his/her responsibility to review these and verify with their primary care doctor and pharmacist that all medications are of the appropriate size. We will schedule the patient for laparoscopic gastric bypass in the near future. The above history and physical examination was reviewed. There has been no interval medical or surgical history. The proposed laparoscopic potentially open Jaylyn-en-Y gastric bypass to achieve definitive durable weight loss on a personal expect resolution of obesity related comorbidities was again discussed. The risk benefits of operating versus not potential tenderness potential complications up to and including death were reviewed.   The patient understood the discussion wished to proceed

## 2022-01-03 NOTE — OP NOTES
[address] [place] [address]   OPERATIVE REPORT    Name:  Aleena Silva  MR#:   [id number]  :  1990  ACCOUNT #:  [de-identified]  DATE OF SERVICE:  2022    PREOPERATIVE DIAGNOSES:  Clinically severe obesity with a body mass index of 42 with obesity-related comorbidities consisting of chronic obstructive sleep apnea. POSTOPERATIVE DIAGNOSES:  Clinically severe obesity with a body mass index of 42 with obesity-related comorbidities consisting of chronic obstructive sleep apnea. PROCEDURES PERFORMED:  Laparoscopic Jaylyn-en-Y gastric bypass utilizing a 15 mL separate tubularized gastric pouch placed on the lesser curvature of the stomach and a 184 cm retrocolic retrogastric Jaylyn limb and a 40 cm biliopancreatic limb. SURGEON:  Darby Manzanares. Miriam Schmid DO    FIRST ASSISTANT:  Keren Wang SA    ANESTHESIA:  General endotracheal supplemented at the conclusion of the procedure with local infiltration of the operative sites with 266 mg of Exparel diluted in 50 mL of normal saline solution. COMPLICATIONS:  None. SPECIMENS REMOVED:  None. IMPLANTS: None    ESTIMATED BLOOD LOSS:  Less than 25 mL. OPERATIVE FINDINGS:  Normal intra-abdominal anatomy. INDICATIONS FOR SURGICAL PROCEDURE:  This is a 79-year-old black female who has failed medical management of her clinically severe obesity and who after investigation of the surgical options has elected to pursue laparoscopic potentially open Jaylyn-en-Y gastric bypass to achieve definitive durable weight loss on a personal level with expected resolution of obesity-related comorbidities. The risks and benefits of operative versus nonoperative potential alternatives and potential complications up to and including death were extensively discussed with the patient prior to the surgical procedure, who voiced her understanding and wished to proceed.     DESCRIPTION OF PROCEDURE:  The patient received Ancef for antibiotic prophylaxis and Lovenox for DVT prophylaxis in the preoperative staging area. After voiding and then signing her operative permit which was properly witnessed, she was then transported to the operating room where she was placed in the supine position on the operating room table and SCDs were placed and inflated prior to the induction of general endotracheal anesthesia. A 34-Croatian orogastric tube was placed atraumatically to gravity drainage and the abdomen was then prepped and draped in the usual sterile fashion. A time-out procedure was performed according to protocol and agreed upon by all personnel in the operating suite. A transverse 12 mm cutaneous incision was made just superior to the umbilicus in the pre-existing incision made for a laparoscopic cholecystectomy and a 12 mm Optiview trocar and cannula were placed in the peritoneal cavity under direct vision utilizing a 10-mm 0-degree laparoscope. The laparoscope and trocar were removed. The abdomen was then insufflated with carbon dioxide gas never exceeding a pressure of 15 mmHg and a 30-degree 10-mm laparoscope was placed and utilized for the remainder of the procedure. The remaining ports were then placed under direct vision utilizing transverse incisions and Optiview trocars and cannulas. The second 5 mm incision was placed in the left anterior axillary line two fingerbreadths below the left costal margin. The third, a 12 mm incision midway between the left upper quadrant and the supraumbilical incision. The fourth a 12 mm incision located in the right paramedian location, 8 cm from the midline, midway between the costal margin and the umbilicus. After placing the appropriately sized Optiview trocars and cannulas, a brief exploration of the upper abdomen was conducted. The patient had adhesive disease located within the pre-existing epigastric incision utilized for her laparoscopic cholecystectomy and these adhesions were taken down utilizing electrocautery.   The omentum and transverse colon then reflected superiorly. The ligament of Treitz was identified; 40 cm distal to the ligament of Treitz, the jejunum was transected utilizing a triple-load stapling device 60 mm in length loaded with 2.5 mm staples. The mesentery was then divided down to its base utilizing the Harmonic scalpel. The Jaylyn limb was measured to be 100 cm in length. At this point, on its antimesenteric border, an enterotomy was created with the Harmonic scalpel. A similar antimesenteric enterotomy was created 2 cm proximal to the staple line of the biliopancreatic limb and a stapled side-to-side functional end-to-side jejunojejunostomy was constructed. A triple-load stapling device 60 mm in length loaded with 2.5 mm staples. It was then placed into each of respective enterotomies and the staples were in antimesenteric borders before firing the staple load. The remaining enteric defect was then closed with a running full-thickness 3-0 Vicryl suture and the mesenteric defect was closed with a running 2-0 silk suture. The ligament of Treitz was re-identified. Just anterior to the ligament of Treitz, a fenestration was created through the mesocolon into the lesser sac utilizing the Harmonic scalpel and the Jaylyn limb was placed through this mesocolic fenestration into the lesser sac. The omentum and transverse colon reflected back to the normal anatomic position. The sowmya was identified along the lesser curvature of the stomach. Just inferior to the sowmya along the greater curvature of the stomach, the omentum was  from the gastric wall utilizing the Harmonic scalpel until the Jaylyn limb was visualized within the lesser sac. A transverse 5 mm cutaneous incision was then made through a pre-existing epigastric incision utilized for the laparoscopic cholecystectomy. A trocar without a cannula was placed in the peritoneal cavity.   This was removed and replaced with a 5 mm atraumatic grasping  forceps which was utilized to elevate the left lobe of the liver and provide hiatal exposure. The peritoneum overlying the angle of His was then opened with the Harmonic scalpel, 5 cm distal to the GE junction along the lesser curvature of the stomach, the neurovascular elements of the lesser omentum were  from the gastric wall. Completion of the lesser curvature fenestration into the lesser sac was accomplished with an esophageal retractor introduced posterior to the stomach through the omental fenestration along the greater curvature. The 34-Serbian orogastric tube was then retracted into the esophagus. A triple-load stapling device, 60 mm in length, loaded with 3.5 mm staples was introduced into the lesser curvature fenestration. It was oriented perpendicular to the lesser curve 5 cm distal to the GE junction prior to firing two-thirds of length of the staple load. The 34-Serbian orogastric tube was then advanced and utilized as a sizing template for construction of the tubularized gastric pouch based on the lesser curvature of the stomach. Initiation of the vertical aspect of the gastric pouch was accomplished by the triple-load stapling device, 60 mm in length, loaded with 3.5 mm staples, utilizing two-thirds the length of the staple load. The remainder of the pouch was constructed with sequential firings of a triple-load stapling device, 60 mm in length, loaded with 3.5 mm staples, ending the transection at the angle of His. The initial full-length firing of the 3.5 mm staple load utilized an Ethicon staple line reinforcement absorbable implant and this then created a 15 mL separate tubularized gastric pouch based on the lesser curvature of the stomach. The Jaylyn limb was elevated posterior to the stomach in the retrogastric position where a tension-free hand-sewn two-layered gastrojejunostomy was constructed.   The geometric orientation of the gastrojejunostomy was at the distal aspect of the tubularized gastric pouch and the antimesenteric border of the Jaylyn limb 2 cm distal to the staple line. The posterior row of running seromuscular 3-0 Vicryl suture was placed. A transverse 12 mm gastrotomy was made at the distal aspect of the tubularized gastric pouch with an adjacent antimesenteric 12 mm Jaylyn limb enterotomy. The running inner layer of full-thickness 3-0 Vicryl suture was initiated in the left lateral posterior aspect of the anastomosis running across the posterior aspect of the anastomosis, running around the right lateral aspect of the anastomosis to the anterior midline. A second full-thickness 3-0 Vicryl suture was initiated adjacent to the origin of the first and running around the left lateral aspect of the anastomosis to the anterior midline. The 34-East Timorese orogastric tube was then advanced across the gastrojejunal anastomosis into the Jaylyn limb prior to tying with a running inner layer of full-thickness 3-0 Vicryl sutures together anteriorly. The gastrojejunal anastomosis was completed anteriorly utilizing a running seromuscular 3-0 Vicryl suture. The 34-East Timorese orogastric tube was removed; and after assuring there was adequate redundancy of the Jaylyn limb above the level of the mesocolon, the omentum and transverse colon were reflected superiorly. The space through which a Viera's hernia might occur was closed with a running 2-0 silk suture and the mesocolic defect was closed with a series of simple interrupted 2-0 silk sutures. The omentum and transverse colon were returned to their normal anatomic position. The Jaylyn limb was noted to be viable with adequate redundancy above the level of the mesocolon. There was no tension noted at the gastrojejunal anastomosis. The self-retaining retractor and atraumatic grasping forceps were removed.   The laparoscope was then shifted to the left mid abdominal port to allow visualization of the supraumbilical fascial trocar defect, which was closed with a suture passing device and #2 Vicryl suture. All operative sites were then  inspected and noted to be hemostatic. The abdomen was then desufflated of carbon dioxide gas. Trocars were removed under direct vision. The fascial sutures were tied. The wounds were irrigated with normal saline solution, and closure of the skin was accomplished with a series of simple interrupted buried deep dermal 4-0 Monocryl sutures. Steri-Strips were applied as well as the sterile dressings. The sponge and needle count was correct both during and at the completion of the procedure. The patient tolerated the procedure without operative complications and was subsequently extubated and transported to the recovery room in awake, alert, and stable condition. Estimated blood loss was less than 25 mL. The patient received 1500 mL of crystalloid during the procedure. Operative start time was 0845, completion time was 1016.       Damian Menjivar DO      [id number]  D:  01/03/2022 14:04  T:  01/03/2022 18:19  JOB #:  [id number]

## 2022-01-03 NOTE — ANESTHESIA PREPROCEDURE EVALUATION
Relevant Problems   No relevant active problems       Anesthetic History   No history of anesthetic complications            Review of Systems / Medical History  Patient summary reviewed and pertinent labs reviewed    Pulmonary  Within defined limits                 Neuro/Psych   Within defined limits           Cardiovascular                  Exercise tolerance: >4 METS     GI/Hepatic/Renal                Endo/Other        Morbid obesity     Other Findings              Physical Exam    Airway  Mallampati: II  TM Distance: 4 - 6 cm  Neck ROM: normal range of motion   Mouth opening: Normal     Cardiovascular    Rhythm: regular  Rate: normal         Dental  No notable dental hx       Pulmonary  Breath sounds clear to auscultation               Abdominal  GI exam deferred       Other Findings            Anesthetic Plan    ASA: 2  Anesthesia type: general          Induction: Intravenous  Anesthetic plan and risks discussed with: Patient

## 2022-01-03 NOTE — PERIOP NOTES
TRANSFER - OUT REPORT:    Telephone  report given to Kristopher(name) on Hector Running  being transferred to [facility](unit) for routine post - op       Report consisted of patients Situation, Background, Assessment and   Recommendations(SBAR). Information from the following report(s) Kardex, OR Summary and MAR was reviewed with the receiving nurse. Lines:   Peripheral IV 01/03/22 Anterior; Left Hand (Active)   Site Assessment Clean, dry, & intact 01/03/22 1036   Phlebitis Assessment 0 01/03/22 1036   Infiltration Assessment 0 01/03/22 1036   Dressing Status Clean, dry, & intact 01/03/22 1036   Dressing Type Tape;Transparent 01/03/22 1036   Hub Color/Line Status Pink; Infusing 01/03/22 1036   Action Taken Dressing reinforced 01/03/22 0739   Alcohol Cap Used No 01/03/22 0739        Opportunity for questions and clarification was provided.       Patient transported with:   [employer]

## 2022-01-03 NOTE — ANESTHESIA POSTPROCEDURE EVALUATION
Procedure(s):  LAPAROSCOPIC GAURAV-EN-Y GASTRIC BYPASS.     general    Anesthesia Post Evaluation      Multimodal analgesia: multimodal analgesia used between 6 hours prior to anesthesia start to PACU discharge  Patient location during evaluation: bedside  Patient participation: complete - patient participated  Level of consciousness: awake  Pain management: adequate  Airway patency: patent  Anesthetic complications: no  Cardiovascular status: stable  Respiratory status: acceptable  Hydration status: acceptable  Post anesthesia nausea and vomiting:  controlled      INITIAL Post-op Vital signs:   Vitals Value Taken Time   /67 01/03/22 1146   Temp 36.7 °C (98 °F) 01/03/22 1146   Pulse 86 01/03/22 1148   Resp 19 01/03/22 1148   SpO2 100 % 01/03/22 1148

## 2022-01-04 VITALS
HEART RATE: 84 BPM | DIASTOLIC BLOOD PRESSURE: 75 MMHG | TEMPERATURE: 98.4 F | SYSTOLIC BLOOD PRESSURE: 145 MMHG | OXYGEN SATURATION: 96 % | HEIGHT: 68 IN | WEIGHT: 275 LBS | BODY MASS INDEX: 41.68 KG/M2 | RESPIRATION RATE: 16 BRPM

## 2022-01-04 LAB
ANION GAP SERPL CALC-SCNC: 7 MMOL/L (ref 3–18)
BUN SERPL-MCNC: 9 MG/DL (ref 7–18)
BUN/CREAT SERPL: 14 (ref 12–20)
CALCIUM SERPL-MCNC: 8.4 MG/DL (ref 8.5–10.1)
CHLORIDE SERPL-SCNC: 108 MMOL/L (ref 100–111)
CO2 SERPL-SCNC: 23 MMOL/L (ref 21–32)
CREAT SERPL-MCNC: 0.66 MG/DL (ref 0.6–1.3)
ERYTHROCYTE [DISTWIDTH] IN BLOOD BY AUTOMATED COUNT: 12.8 % (ref 11.6–14.5)
GLUCOSE SERPL-MCNC: 82 MG/DL (ref 74–99)
HCT VFR BLD AUTO: 33.7 % (ref 35–45)
HGB BLD-MCNC: 11.4 G/DL (ref 12–16)
MAGNESIUM SERPL-MCNC: 2.1 MG/DL (ref 1.6–2.6)
MCH RBC QN AUTO: 27.5 PG (ref 24–34)
MCHC RBC AUTO-ENTMCNC: 33.8 G/DL (ref 31–37)
MCV RBC AUTO: 81.2 FL (ref 78–100)
NRBC # BLD: 0 K/UL (ref 0–0.01)
NRBC BLD-RTO: 0 PER 100 WBC
PLATELET # BLD AUTO: 182 K/UL (ref 135–420)
PMV BLD AUTO: 10.9 FL (ref 9.2–11.8)
POTASSIUM SERPL-SCNC: 3.5 MMOL/L (ref 3.5–5.5)
RBC # BLD AUTO: 4.15 M/UL (ref 4.2–5.3)
SODIUM SERPL-SCNC: 138 MMOL/L (ref 136–145)
WBC # BLD AUTO: 10.2 K/UL (ref 4.6–13.2)

## 2022-01-04 PROCEDURE — 36415 COLL VENOUS BLD VENIPUNCTURE: CPT

## 2022-01-04 PROCEDURE — 74011000250 HC RX REV CODE- 250: Performed by: SURGERY

## 2022-01-04 PROCEDURE — 74011250636 HC RX REV CODE- 250/636: Performed by: SURGERY

## 2022-01-04 PROCEDURE — 99024 POSTOP FOLLOW-UP VISIT: CPT | Performed by: SURGERY

## 2022-01-04 PROCEDURE — 85027 COMPLETE CBC AUTOMATED: CPT

## 2022-01-04 PROCEDURE — 80048 BASIC METABOLIC PNL TOTAL CA: CPT

## 2022-01-04 PROCEDURE — 74011250637 HC RX REV CODE- 250/637: Performed by: SURGERY

## 2022-01-04 PROCEDURE — 83735 ASSAY OF MAGNESIUM: CPT

## 2022-01-04 PROCEDURE — C9113 INJ PANTOPRAZOLE SODIUM, VIA: HCPCS | Performed by: SURGERY

## 2022-01-04 RX ORDER — OXYCODONE HYDROCHLORIDE 5 MG/1
5 TABLET ORAL
Qty: 15 TABLET | Refills: 0 | Status: SHIPPED | OUTPATIENT
Start: 2022-01-04 | End: 2022-01-07

## 2022-01-04 RX ORDER — ENOXAPARIN SODIUM 100 MG/ML
40 INJECTION SUBCUTANEOUS DAILY
Qty: 7 EACH | Refills: 0 | Status: SHIPPED | OUTPATIENT
Start: 2022-01-04 | End: 2022-01-21 | Stop reason: ALTCHOICE

## 2022-01-04 RX ADMIN — HYOSCYAMINE SULFATE 0.12 MG: 0.12 TABLET ORAL; SUBLINGUAL at 07:55

## 2022-01-04 RX ADMIN — HYOSCYAMINE SULFATE 0.12 MG: 0.12 TABLET ORAL; SUBLINGUAL at 02:48

## 2022-01-04 RX ADMIN — KETOROLAC TROMETHAMINE 15 MG: 15 INJECTION, SOLUTION INTRAMUSCULAR; INTRAVENOUS at 02:48

## 2022-01-04 RX ADMIN — ACETAMINOPHEN 650 MG: 325 TABLET ORAL at 07:00

## 2022-01-04 RX ADMIN — SODIUM CHLORIDE 40 MG: 9 INJECTION, SOLUTION INTRAMUSCULAR; INTRAVENOUS; SUBCUTANEOUS at 08:36

## 2022-01-04 RX ADMIN — OXYCODONE HYDROCHLORIDE 5 MG: 5 TABLET ORAL at 03:55

## 2022-01-04 RX ADMIN — ENOXAPARIN SODIUM 40 MG: 100 INJECTION SUBCUTANEOUS at 08:36

## 2022-01-04 NOTE — ROUTINE PROCESS
Bedside shift change report given to Kristopher white (oncoming nurse) by Izzy Moya (offgoing nurse). Report included the following information SBAR and Kardex.

## 2022-01-04 NOTE — PROGRESS NOTES
Discharge order noted for today. Orders reviewed. No needs identified at this time.  remains available if needed.   Nena Santana RN - Outcomes Manager  [phone]

## 2022-01-04 NOTE — PROGRESS NOTES
Problem: Falls - Risk of  Goal: *Absence of Falls  Description: Document Cherylle Cockayne Fall Risk and appropriate interventions in the flowsheet.   Outcome: Progressing Towards Goal  Note: Fall Risk Interventions:            Medication Interventions: Assess postural VS orthostatic hypotension,Evaluate medications/consider consulting pharmacy,Patient to call before getting OOB,Teach patient to arise slowly                   Problem: Patient Education: Go to Patient Education Activity  Goal: Patient/Family Education  Outcome: Progressing Towards Goal

## 2022-01-04 NOTE — PROGRESS NOTES
Reason for Admission:  Morbid obesity ([facility] [E66.01]  Sleep apnea, unspecified type [G47.30]  Morbid obesity with BMI of 40.0-44.9, adult ([facility] [E66.01, Z68.41]                 RUR Score:    4%            Plan for utilizing home health:    no                      Likelihood of Readmission:   LOW                         Transition of Care Plan:              Initial assessment completed with patient. Cognitive status of patient: oriented to time, place, person and situation. Face sheet information confirmed:  yes. The patient designates Curtis mejias to participate in her discharge plan and to receive any needed information. This patient lives in a single family home with mother. Patient is able to navigate steps as needed. Prior to hospitalization, patient was considered to be independent with ADLs/IADLS : yes . Patient has a current ACP document on file: no      Healthcare Decision Maker:     Click here to complete [address] including selection of the Healthcare Decision Maker Relationship (ie \"Primary\")    The boyfriend will be available to transport patient home upon discharge. The patient has no DME available in the home. Patient is not currently active with home health. Patient has not stayed in a skilled nursing facility or rehab. This patient is on dialysis :no    Currently, the discharge plan is Home. The patient states that she can obtain her medications from the pharmacy, and take her medications as directed. Patient's current insurance is Medicare and Medicaid. Care Management Interventions  PCP Verified by CM:  Yes  Mode of Transport at Discharge: Self  Transition of Care Consult (CM Consult): Discharge Planning  Support Systems: Parent(s)  Confirm Follow Up Transport: Family  Discharge Location  Discharge Placement: Home        Tory Chávez RN - Outcomes Manager  [phone]

## 2022-01-04 NOTE — ROUTINE PROCESS
Patient is alert and oriented times three with no signs or symptoms of distress. Lap sites are intact and dry and no nausea or vomiting. [id number] of a 10 out of ten pain gave prescribed pain medication which relieved patient pain.

## 2022-01-04 NOTE — ROUTINE PROCESS
Patient is alert and oriented times three with no signs or symptoms of distress at this time No nausea or vomiting

## 2022-01-04 NOTE — DISCHARGE SUMMARY
Bariatric Surgery Discharge Progress Note    Admission Date: 1/3/2022    Discharge Date: 1/4/2022      PREOPERATIVE DIAGNOSES:  Clinically severe obesity with a body mass index of 42 with obesity-related comorbidities consisting of chronic obstructive sleep apnea.     POSTOPERATIVE DIAGNOSES:  Clinically severe obesity with a body mass index of 42 with obesity-related comorbidities consisting of chronic obstructive sleep apnea.     PROCEDURES PERFORMED:  Laparoscopic Jaylyn-en-Y gastric bypass utilizing a 15 mL separate tubularized gastric pouch placed on the lesser curvature of the stomach and a 262 cm retrocolic retrogastric Jaylyn limb and a 40 cm biliopancreatic limb.     Postop Complications: none    Hospital Course:  Patient was admitted on 1/3/2022 for scheduled bariatric surgery. Operation was without significant complication. Patient admitted to the floor postoperatively, monitored as per protocol. Diet sequentially advanced beginning POD 1, pain medications transitioned to oral during the hospital course. Currently the patient is afebrile, vital signs stable, tolerating a clear liquid diet with protein supplementation, voiding spontaneously, ambulatory with adequate pain control with oral medications and clear surgical sites without evidence of infection. Discharge Diet:  Clear Liquid Bariatric Diet for 7 days, then soft moist protein diet for 5 weeks    Discharge Medications:  Discharge Medication List as of 1/4/2022 12:30 PM      START taking these medications    Details   oxyCODONE IR (ROXICODONE) 5 mg immediate release tablet Take 1 Tablet by mouth every four (4) hours as needed for Pain for up to 3 days. Max Daily Amount: 30 mg., Normal, Disp-15 Tablet, R-0         CONTINUE these medications which have NOT CHANGED    Details   topiramate (TOPAMAX) 50 mg tablet Take 50 mg by mouth daily. , Historical Med      phentermine (ADIPEX-P) 37.5 mg tablet Take 37.5 mg by mouth daily. , Historical Med Bariatric Chewable vitamins, 2 orally daily for life  Calcium Citrate 1500 mg orally daily for life  Vitamin B12 1000 micrograms sublingual daily for life  Vitamin D3 5,000 units orally daily for life   Vitamin B1 100 mg orally daily for life    Discharge disposition: home    Discharge condition: stable      Local wound care with daily showers, keep wounds clean and dry     Activity: as desired, no lifting, pushing, or pulling greater than 15lbs or situps for 30 days     Special Instructions:            - No driving until activity is not influenced by incisional pain and off narcotics            - No bath or hot tub until wounds are healed            - Check pulse and temperature twice daily for 10 days            - Notify [facility] for a Temp >100.5 or Pulse>115     Follow-up with your surgeon in 2 weeks, call office for appointment [phone] ([address] [place] [address]) [phone]([address])

## 2022-01-04 NOTE — PROGRESS NOTES
Problem: Falls - Risk of  Goal: *Absence of Falls  Description: Document Essence Marking Fall Risk and appropriate interventions in the flowsheet.   Outcome: Progressing Towards Goal  Note: Fall Risk Interventions:            Medication Interventions: Assess postural VS orthostatic hypotension,Evaluate medications/consider consulting pharmacy,Patient to call before getting OOB,Teach patient to arise slowly                   Problem: Patient Education: Go to Patient Education Activity  Goal: Patient/Family Education  Outcome: Progressing Towards Goal

## 2022-01-09 PROCEDURE — 99281 EMR DPT VST MAYX REQ PHY/QHP: CPT

## 2022-01-10 ENCOUNTER — HOSPITAL ENCOUNTER (EMERGENCY)
Age: 32
Discharge: HOME | End: 2022-01-10
Attending: STUDENT IN AN ORGANIZED HEALTH CARE EDUCATION/TRAINING PROGRAM
Payer: MEDICARE

## 2022-01-10 VITALS
OXYGEN SATURATION: 97 % | RESPIRATION RATE: 18 BRPM | WEIGHT: 275 LBS | HEIGHT: 68 IN | TEMPERATURE: 98.4 F | BODY MASS INDEX: 41.68 KG/M2 | HEART RATE: 106 BPM | SYSTOLIC BLOOD PRESSURE: 136 MMHG | DIASTOLIC BLOOD PRESSURE: 75 MMHG

## 2022-01-10 DIAGNOSIS — M25.562 ARTHRALGIA OF LEFT LOWER LEG: Primary | ICD-10-CM

## 2022-01-10 RX ORDER — DICLOFENAC SODIUM 10 MG/G
GEL TOPICAL
Qty: 100 G | Refills: 0 | Status: SHIPPED | OUTPATIENT
Start: 2022-01-10 | End: 2022-04-01

## 2022-01-10 RX ORDER — LIDOCAINE 4 G/100G
PATCH TOPICAL
Qty: 15 EACH | Refills: 0 | Status: SHIPPED | OUTPATIENT
Start: 2022-01-10 | End: 2022-04-01

## 2022-01-10 RX ORDER — DICLOFENAC SODIUM 10 MG/G
GEL TOPICAL
Qty: 100 G | Refills: 0 | Status: SHIPPED | OUTPATIENT
Start: 2022-01-10 | End: 2022-01-10 | Stop reason: SDUPTHER

## 2022-01-10 RX ORDER — LIDOCAINE 4 G/100G
PATCH TOPICAL
Qty: 15 EACH | Refills: 0 | Status: SHIPPED | OUTPATIENT
Start: 2022-01-10 | End: 2022-01-10 | Stop reason: SDUPTHER

## 2022-01-10 NOTE — ED TRIAGE NOTES
Patient comes in stating that she has numbness to her left thigh for about 1 week. Patient concerned because she just had a gastric bypass surgery. Patient states that it somewhat feels like a muscle cramp.

## 2022-01-10 NOTE — ED PROVIDER NOTES
HPI   Patient is 53-year-old female who presents with a 1 week history of left outer thigh numbness and burning pain. Started 1 day after she had surgery for gastric bypass. It is localized to a patch of her left upper outer thigh. She symptoms typically hurt but it is more numb and does occasionally get to be a burning sensation. She is not taking anything for it. She denies any pain in her back, difficulty walking, swelling in her leg, trouble holding onto her urine or stool. Standpoint of her surgery she been doing well. She is done liquid diet which she is tolerating well. Abdominal pain is significantly improved. No trouble walking or ambulating. Has been attempting to massage the area with any sleeping improvement in her symptoms. No past medical history on file.     Past Surgical History:   Procedure Laterality Date    HX CHOLECYSTECTOMY           Family History:   Problem Relation Age of Onset    Breast Cancer Maternal Grandmother        Social History     Socioeconomic History    Marital status: SINGLE     Spouse name: Not on file    Number of children: Not on file    Years of education: Not on file    Highest education level: Not on file   Occupational History    Not on file   Tobacco Use    Smoking status: Never Smoker    Smokeless tobacco: Current User    Tobacco comment: vape no tobacco   Substance and Sexual Activity    Alcohol use: Not Currently     Comment: socially    Drug use: Never    Sexual activity: Yes     Partners: Male     Birth control/protection: None   Other Topics Concern    Not on file   Social History Narrative    ** Merged History Encounter **          Social Determinants of Health     Financial Resource Strain:     Difficulty of Paying Living Expenses: Not on file   Food Insecurity:     Worried About Running Out of Food in the Last Year: Not on file    Ethan of Food in the Last Year: Not on file   Transportation Needs:     Lack of Transportation (Medical): Not on file    Lack of Transportation (Non-Medical):  Not on file   Physical Activity:     Days of Exercise per Week: Not on file    Minutes of Exercise per Session: Not on file   Stress:     Feeling of Stress : Not on file   Social Connections:     Frequency of Communication with Friends and Family: Not on file    Frequency of Social Gatherings with Friends and Family: Not on file    Attends [employer] Services: Not on file    Active Member of [address] [place] [address] or Organizations: Not on file    Attends Club or Organization Meetings: Not on file    Marital Status: Not on file   Intimate Partner Violence:     Fear of Current or Ex-Partner: Not on file    Emotionally Abused: Not on file    Physically Abused: Not on file    Sexually Abused: Not on file   Housing Stability:     Unable to Pay for Housing in the Last Year: Not on file    Number of [place] in the Last Year: Not on file    Unstable Housing in the Last Year: Not on file         ALLERGIES: Bactrim [sulfamethoprim] and Bactrim [sulfamethoprim ds]    Review of Systems  Constitutional: No fever  HENT: No ear pain  Eyes: No change in vision  Respiratory: No SOB  Cardio: No chest pain  GI: No blood in stool  : No hematuria  MSK: No back pain  Skin: No rashes  Neuro: No headache    Vitals:    01/10/22 0051   BP: 136/75   Pulse: (!) 106   Resp: 18   Temp: 98.4 °F (36.9 °C)   SpO2: 97%   Weight: 124.7 kg (275 lb)   Height: 5' 8\" (1.727 m)            Physical Exam   General: No acute distress  Head: Normocephalic, atraumatic  Psych: Cooperative and alert  Eyes: No scleral icterus, normal conjunctiva  ENT: Moist oral mucosa  Neck: Supple  CV: Regular rate and rhythm, no pitting edema, palpable radial pulses  Pulm: Clear breath sounds bilaterally without any wheezing or rhonchi, normal respiratory rate  GI: Normal bowel sounds, soft, non-tender  MSK: Moves all four extremities  Skin: No rashes  Neuro: Alert and conversive, 5/5 strength of bilateral lower extremities, sensation grossly intact except for slightly decreased to left lateral thigh    MDM   Patient is a 79-year-old female who presents with left lateral thigh numbness and tingling. This is consistent with a localized nerve injury. The fact that started the day after surgery is concerning that she had some sort of pressure point there during surgery however she is otherwise neurovascularly intact. She is no signs of infection or trauma. She otherwise appears well. We discussed with her the fact that this should improve slowly on its own but discussed different management options to use moving forward. No concerning findings of infection, trauma or other significant process. Patient stable for discharge at this time. Patient is in agreement with the plan to be discharged at this time. All the patient's questions were answered. Patient was given written instructions on the diagnosis, and states understanding of the plan moving forward. We did discuss important signs and symptoms that should prompt quick return to the emergency department. Disposition: Patient was discharged home in stable condition.   They will follow up with their primary care physician    Prescriptions: Lidocaine patch and Voltaren    Diagnosis: Acute local sensory nerve injury to left lateral thigh    Procedures

## 2022-01-11 ENCOUNTER — DOCUMENTATION ONLY (OUTPATIENT)
Dept: BARIATRICS/WEIGHT MGMT | Age: 32
End: 2022-01-11

## 2022-01-11 NOTE — PROGRESS NOTES
1/11/2022:  Patient is 1 week post op. She called me this morning and stated she is in a lot of pain. She states she called the paramedics because her anxiety was increased. Patient states she felt discomfort after drinking water. I have reviewed some trouble shooting with patient. This includes using the 1 ounce cups to sip her fluid over 10-15 minutes and then repeating. Once she is able to gauge a sip, she can progress to sipping from a bottle. She is also encouraged to try room temperature fluids over the cold water she was using. Patient will contact me if the problem persists.     Lorraine Smith MS RD

## 2022-01-21 ENCOUNTER — OFFICE VISIT (OUTPATIENT)
Dept: SURGERY | Age: 32
End: 2022-01-21
Payer: MEDICARE

## 2022-01-21 VITALS
HEART RATE: 82 BPM | BODY MASS INDEX: 40.32 KG/M2 | TEMPERATURE: 97.4 F | SYSTOLIC BLOOD PRESSURE: 114 MMHG | WEIGHT: 266 LBS | DIASTOLIC BLOOD PRESSURE: 78 MMHG | RESPIRATION RATE: 18 BRPM | HEIGHT: 68 IN

## 2022-01-21 DIAGNOSIS — K91.2 POSTOPERATIVE MALABSORPTION: Primary | ICD-10-CM

## 2022-01-21 PROCEDURE — 99024 POSTOP FOLLOW-UP VISIT: CPT | Performed by: SURGERY

## 2022-01-21 RX ORDER — LANOLIN ALCOHOL/MO/W.PET/CERES
CREAM (GRAM) TOPICAL DAILY
COMMUNITY

## 2022-01-21 RX ORDER — LANOLIN ALCOHOL/MO/W.PET/CERES
1000 CREAM (GRAM) TOPICAL DAILY
COMMUNITY

## 2022-01-21 RX ORDER — GLUCOSAMINE SULFATE 1500 MG
5000 POWDER IN PACKET (EA) ORAL DAILY
COMMUNITY

## 2022-01-21 NOTE — PROGRESS NOTES
Chief Complaint   Patient presents with    Post OP Follow Up     1/3/2022 gastric bypass     Pt ID confirmed    Weight Loss Metrics 1/21/2022 1/21/2022 1/10/2022 1/3/2022 12/21/2021 11/26/2021 11/26/2021   Pre op / Initial Wt 275 - - - - 275 -   Today's Wt - 266 lb 275 lb - 275 lb - 275 lb   BMI - 40.45 kg/m2 41.81 kg/m2 41.81 kg/m2 - - 41.81 kg/m2   Ideal Body Wt 143 - - - - 143 -   Excess Body Wt 132 - - - - 132 -   Goal Wt 169 - - - - 169 -   Wt loss to date 9 - - - - 0 -   % Wt Loss 0.09 - - - - 0 -   80% .6 - - - - 105.6 -   1. Have you been to the ER, urgent care clinic since your last visit? Hospitalized since your last visit? Yes thigh numbness burning left    2. Have you seen or consulted any other health care providers outside of the [address] [place] [address] since your last visit? Include any pap smears or colon screening. No    Body mass index is 40.45 kg/m².     Post op medications:  MVI: 2x  Calcium Citrate: 500 milligrams 3 x day  Actigal 0  B-12 1000 micrograms  Vit D 3 5000 units thiamine 100 milligram

## 2022-01-21 NOTE — PROGRESS NOTES
Bariatric Follow-Up Evaluation    Cuauhtemoc Patel is a 36 y.o. black female who presents in follow-up status post laparoscopic gastric bypass January 30, 2022  Expected Incisional discomfort no longer requiring analgesics and otherwise without complaint. Compliant with an early post gastric bypass diet meeting both fluid and protein goals. Patient notes appropriate early satiety with no specific food intolerances or pathologic emesis and has not experienced dumping syndrome as she has avoided high density carbohydrates  Compliant with multiple vitamin calcium citrate, vitamin B1, vitamin B12, and vitamin D supplementation  Exercise: Walking up to 1 hour on daily basis  Comorbidities: Medical obstructive sleep apnea-resolved  Preoperative weight: 275  Current weight: 266  Estimated postsurgical postsurgical weight loss: 106  Current weight loss: 9  Goal weight: 169  Percentage weight loss: 9% / 18 days      Weight Loss Metrics 1/21/2022 1/21/2022 1/10/2022 1/3/2022 12/21/2021 11/26/2021 11/26/2021   Pre op / Initial Wt 275 - - - - 275 -   Today's Wt - 266 lb 275 lb - 275 lb - 275 lb   BMI - 40.45 kg/m2 41.81 kg/m2 41.81 kg/m2 - - 41.81 kg/m2   Ideal Body Wt 143 - - - - 143 -   Excess Body Wt 132 - - - - 132 -   Goal Wt 169 - - - - 169 -   Wt loss to date 9 - - - - 0 -   % Wt Loss 0.09 - - - - 0 -   80% .6 - - - - 105.6 -       Allergies   Allergen Reactions    Bactrim [Sulfamethoprim] Hives    Bactrim [Sulfamethoprim Ds] Hives     Denies allergy 03/25/2019       Current Outpatient Medications on File Prior to Visit   Medication Sig Dispense Refill    multivitamin with iron (FLINTSTONES) chewable tablet Take 1 Tablet by mouth two (2) times a day.  OTHER Calcium citrate 500 millirams 3x a day      cholecalciferol (Vitamin D3) 25 mcg (1,000 unit) cap Take 5,000 Units by mouth daily.  cyanocobalamin (Vitamin B-12) 1,000 mcg tablet Take 1,000 mcg by mouth daily.       thiamine HCL (B-1) 100 mg tablet Take  by mouth daily.  diclofenac (VOLTAREN) 1 % gel Apply  to affected area four (4) times daily as needed for Pain. (Patient not taking: Reported on 1/21/2022) 100 g 0    lidocaine 4 % patch Apply to area of discomfort every 12 hours as needed. (Patient not taking: Reported on 1/21/2022) 15 Each 0    topiramate (TOPAMAX) 50 mg tablet Take 50 mg by mouth daily. (Patient not taking: Reported on 1/21/2022)      phentermine (ADIPEX-P) 37.5 mg tablet Take 37.5 mg by mouth daily. (Patient not taking: Reported on 1/21/2022)       No current facility-administered medications on file prior to visit. No past medical history on file. Past Surgical History:   Procedure Laterality Date    HX CHOLECYSTECTOMY      HX GI      gastric bypass       Social History     Tobacco Use    Smoking status: Never Smoker    Smokeless tobacco: Current User    Tobacco comment: vape no tobacco   Substance Use Topics    Alcohol use: Not Currently     Comment: socially    Drug use: Never       Family History   Problem Relation Age of Onset    Breast Cancer Maternal Grandmother        ROS:  General: Negative for fevers, chills, night sweats, fatigue, weight loss, or weight gain. GI: Negative for abdominal pain, change in bowel habits, hematochezia, melena, or GERD. Integumentary: Negative for dermatitis or abnormal moles. HEENT: Negative for visual changes, vertigo, epistaxis, dysphagia, or hoarseness    Cardiac: Negative for chest pain, palpitations, hypertension, edema, or varicosities    Resp: Negative for cough, shortness of breath, wheezing, hemoptysis, snoring, or reactive airway disease    : Negative for hematuria, dysuria, frequency, urgency, nocturia, or stress urinary incontinence    MSK:  Negative for weakness, joint pain, or arthritis    Endocrine: Negative for diabetes, thyroid disease, polyuria, polydipsia, polyphagia, poor wound, heat intolerance, or cold intolerance.     Lymph/Heme: Negative for anemia, bruising, or history of blood transfusions. Neuro:  Negative for dizziness, headache, fainting, seizures, and stroke. Psychiatry:  Negative for anxiety or depression    Physical Exam    Visit Vitals  /78   Pulse 82   Temp 97.4 °F (36.3 °C)   Resp 18   Ht 5' 8\" (1.727 m)   Wt 120.7 kg (266 lb)   BMI 40.45 kg/m²       Nursing note reviewed. General: 36 y.o. female is in no acute distress. Head: Normocephalic, atraumatic  Resp: Clear to auscultation bilaterally, no wheezing, rhonchi, or rales, excursions normal and symmetrical.  Cardio: Regular rate and rhythm, no murmurs, clicks, gallops, or rubs. No edema or varicosities. Abdomen: Obese, soft, nontender, nondistended, normoactive bowel sounds, no hernias, no hepatosplenomegaly, wounds clean dry approximated with appropriate surrounding induration incisional tenderness without evidence of infectious complications or incisional hernia  Psych: Alert and oriented to person, place, and time. Impression    Status post laparoscopic gastric bypass January 3, 2022 with appropriate weight loss and comorbidity resolution      Plan    Formal bariatric nutrition evaluation for assistance with advancement of diet  Continue compliance with yearly post gastric bypass diet, vitamin supplementation protocol, exercise regimen, encourage monthly attendance.   Spoke to meetings  Follow-up March 2022 with 3-month labs for ongoing bariatric surgical evaluation

## 2022-02-11 ENCOUNTER — DOCUMENTATION ONLY (OUTPATIENT)
Dept: BARIATRICS/WEIGHT MGMT | Age: 32
End: 2022-02-11

## 2022-02-11 ENCOUNTER — HOSPITAL ENCOUNTER (OUTPATIENT)
Dept: BARIATRICS/WEIGHT MGMT | Age: 32
Discharge: HOME | End: 2022-02-11

## 2022-02-11 NOTE — PROGRESS NOTES
[facility]  POST-OP NUTRITION FOLLOW UP    Patient's Name: Miroslava Nick  YOB: 1990  Surgery Date: 1/3/2022      Procedure: Gastric Bypass    Surgeon: Dr. Kaylynn Bianchi    Height: 5 f 8     Pre-Op Weight: 275     Current Weight: 256  Weight Lost: 19    BMI:  39.0    Attendance of support group:   When:   Why not:     Complications  Readmittance: None  Reoperations: None  Complications: None  IV Fluids: None  ER Trips: None    Problem Areas:   Nausea: None   Vomiting: In the beginning, but currently is not. Dumping Syndrome: None  Inadequate Protein: None, patient states she is getting 1 shake per day. Inadequate Fluids: None, patient states she is drinking 64 ounces per day  Food Intolerance:   Hunger: None   Constipation: None    Eating 3 Meals/Day: 2 plus protein shakes  Portion Size at Meals: 1 ounce     Protein from Food:     Foods being consumed:  Breakfast: Time: 6:30 am:  Protein shakes (Premier)  Lunch: Time: 1:00 pm:  Refried beans (1 ounce)   Or baked chicken   Dinner:  Time: 5:00 pm:   Spinach and fish  (1 ounce). Patient is pureeing  In-between eating: None    Length of time for meals: 20-25 minutes    food/fluids: 30/30    Fluids: 64 oz/day   Types of Fluids: water, protein shakes    MVI: Flinstones Complete    Number/Day: bid   Taken Separately: Yes    Vitamin B1: Yes  Dosin mg    Calcium: Chewables    Calcium Dosing: tid    Taken Separately: yes    Vitamin B12: sublingual   Vitamin B12 Dosin mcg    Vitamin D: Yes   Vitamin D dosin IU    Iron:  n/a    Iron dosing:      Protein Supplement: Premier     Grams of Protein: 30   Mixed with:      Splitting Protein Drink in /2:    Timing of Protein Drinks:   Patient is taking 7 days a week. Exercise: Patient states she is going to the park every day and will do jogging and walking. She is doing this twice a day      Comments:    Patient is 6 weeks post op.   Her weight loss is slightly less than anticipated. Patient denies any snacking between and states she has been sticking to just the protein, but did recently add in vegetables. She states her mom is pureeing her meals, which I have discouraged her from doing this. Discussed that this can allow her to get bigger portions in. I talked to her about chewing her food and allow her body to feel the restriction. She is taking her vitamins, getting 1 shake a day and getting her fluid in. We talked about if she is getting hungry to aim for 3 meals and the shake instead of using the shake as a meal replacement. I will follow up with her in 3 months. She is walking BID and trying to start running. Patient was educated on the importance of eating meat and vegetables only. I have talked with patient about the effects of carbohydrates, not only from a weight management perspective, but also what effects it could have on their blood sugar and what reactive hypoglycemia is.         Diet Follow Up:  May 20 at 9 am.      Kassandra Baxter Dandre [address] RD    2/11/2022

## 2022-03-19 PROBLEM — E66.01 MORBID OBESITY WITH BMI OF 40.0-44.9, ADULT (HCC): Status: ACTIVE | Noted: 2022-01-03

## 2022-03-28 ENCOUNTER — HOSPITAL ENCOUNTER (OUTPATIENT)
Dept: LAB | Age: 32
Discharge: HOME | End: 2022-03-28

## 2022-03-28 LAB — XX-LABCORP SPECIMEN COL,LCBCF: NORMAL

## 2022-03-28 PROCEDURE — 99001 SPECIMEN HANDLING PT-LAB: CPT

## 2022-03-28 PROCEDURE — 36415 COLL VENOUS BLD VENIPUNCTURE: CPT

## 2022-03-29 LAB
25(OH)D3+25(OH)D2 SERPL-MCNC: 61 NG/ML (ref 30–100)
ALBUMIN SERPL-MCNC: 4.2 G/DL (ref 3.9–5)
ALBUMIN/GLOB SERPL: 1.6 {RATIO} (ref 1.2–2.2)
ALP SERPL-CCNC: 53 IU/L (ref 44–121)
ALT SERPL-CCNC: 32 IU/L (ref 0–32)
AST SERPL-CCNC: 27 IU/L (ref 0–40)
BASOPHILS # BLD AUTO: 0 X10E3/UL (ref 0–0.2)
BASOPHILS NFR BLD AUTO: 1 %
BILIRUB SERPL-MCNC: 0.4 MG/DL (ref 0–1.2)
BUN SERPL-MCNC: 6 MG/DL (ref 6–20)
BUN/CREAT SERPL: 7 (ref 9–23)
CALCIUM SERPL-MCNC: 9.1 MG/DL (ref 8.7–10.2)
CHLORIDE SERPL-SCNC: 102 MMOL/L (ref 96–106)
CO2 SERPL-SCNC: 22 MMOL/L (ref 20–29)
CREAT SERPL-MCNC: 0.84 MG/DL (ref 0.57–1)
EGFR: 96 ML/MIN/1.73
EOSINOPHIL # BLD AUTO: 0.2 X10E3/UL (ref 0–0.4)
EOSINOPHIL NFR BLD AUTO: 4 %
ERYTHROCYTE [DISTWIDTH] IN BLOOD BY AUTOMATED COUNT: 13.2 % (ref 11.7–15.4)
FERRITIN SERPL-MCNC: 45 NG/ML (ref 15–150)
FOLATE SERPL-MCNC: 15.2 NG/ML
GLOBULIN SER CALC-MCNC: 2.7 G/DL (ref 1.5–4.5)
GLUCOSE SERPL-MCNC: 83 MG/DL (ref 65–99)
HCT VFR BLD AUTO: 38.7 % (ref 34–46.6)
HGB BLD-MCNC: 12.7 G/DL (ref 11.1–15.9)
IMM GRANULOCYTES # BLD AUTO: 0 X10E3/UL (ref 0–0.1)
IMM GRANULOCYTES NFR BLD AUTO: 0 %
IRON SERPL-MCNC: 77 UG/DL (ref 27–159)
LYMPHOCYTES # BLD AUTO: 2.4 X10E3/UL (ref 0.7–3.1)
LYMPHOCYTES NFR BLD AUTO: 53 %
MCH RBC QN AUTO: 27.9 PG (ref 26.6–33)
MCHC RBC AUTO-ENTMCNC: 32.8 G/DL (ref 31.5–35.7)
MCV RBC AUTO: 85 FL (ref 79–97)
MONOCYTES # BLD AUTO: 0.4 X10E3/UL (ref 0.1–0.9)
MONOCYTES NFR BLD AUTO: 10 %
NEUTROPHILS # BLD AUTO: 1.4 X10E3/UL (ref 1.4–7)
NEUTROPHILS NFR BLD AUTO: 32 %
PLATELET # BLD AUTO: 194 X10E3/UL (ref 150–450)
POTASSIUM SERPL-SCNC: 3.9 MMOL/L (ref 3.5–5.2)
PROT SERPL-MCNC: 6.9 G/DL (ref 6–8.5)
RBC # BLD AUTO: 4.56 X10E6/UL (ref 3.77–5.28)
SODIUM SERPL-SCNC: 140 MMOL/L (ref 134–144)
VIT B12 SERPL-MCNC: >2000 PG/ML (ref 232–1245)
WBC # BLD AUTO: 4.4 X10E3/UL (ref 3.4–10.8)

## 2022-04-01 ENCOUNTER — OFFICE VISIT (OUTPATIENT)
Dept: SURGERY | Age: 32
End: 2022-04-01
Payer: MEDICARE

## 2022-04-01 VITALS
BODY MASS INDEX: 37.28 KG/M2 | HEIGHT: 68 IN | WEIGHT: 246 LBS | TEMPERATURE: 98.2 F | DIASTOLIC BLOOD PRESSURE: 79 MMHG | SYSTOLIC BLOOD PRESSURE: 115 MMHG | RESPIRATION RATE: 18 BRPM

## 2022-04-01 DIAGNOSIS — E66.9 OBESITY (BMI 30-39.9): ICD-10-CM

## 2022-04-01 DIAGNOSIS — Z98.84 S/P GASTRIC BYPASS: ICD-10-CM

## 2022-04-01 DIAGNOSIS — K91.2 POST-RESECTION MALABSORPTION: Primary | ICD-10-CM

## 2022-04-01 PROCEDURE — 99024 POSTOP FOLLOW-UP VISIT: CPT | Performed by: NURSE PRACTITIONER

## 2022-04-01 RX ORDER — PHENTERMINE HYDROCHLORIDE 37.5 MG/1
37.5 TABLET ORAL DAILY
Qty: 30 TABLET | Refills: 0 | Status: SHIPPED | OUTPATIENT
Start: 2022-04-01

## 2022-04-01 RX ORDER — PHENTERMINE HYDROCHLORIDE 37.5 MG/1
37.5 TABLET ORAL
Status: CANCELLED | OUTPATIENT
Start: 2022-04-01

## 2022-04-01 RX ORDER — TOPIRAMATE 25 MG/1
25 TABLET ORAL DAILY
Qty: 30 TABLET | Refills: 0 | Status: SHIPPED | OUTPATIENT
Start: 2022-04-01

## 2022-04-01 NOTE — PROGRESS NOTES
Bariatric Postoperative Progress Note  CC: 3 Month LGBP Follow Up    Sanna Nguyen is a 29 y.o. female now 3 months status post laparoscopic gastric bypass surgery performed on 1/3/2022. Doing well overall. Currently eating boiled egg, protein shake, salad. Taking in 64 plus oz water,  30g protein. 60 min of walking/treadmill 6 days a week. Bowel movements are regular. The patient is not having any pain. She is compliant with multivitamins, calcium, Vit D and B12 supplements. No longer pureeing foods per Henrry Hobbs RD's recommendations. Was seeing Dr. Vinita Waldrop at [facility] prior to surgery for medical weight loss, was on phentermine/topiramate and has follow up scheduled for 4/20 to possibly restart medications. Denies any NSAID, ETOH, or tobacco use. Weight Loss Metrics 4/1/2022 4/1/2022 1/21/2022 1/21/2022 1/10/2022 1/3/2022 12/21/2021   Pre op / Initial Wt 275 - 275 - - - -   Today's Wt - 246 lb - 266 lb 275 lb - 275 lb   BMI - 37.4 kg/m2 - 40.45 kg/m2 41.81 kg/m2 41.81 kg/m2 -   Ideal Body Wt 143 - 143 - - - -   Excess Body Wt 132 - 132 - - - -   Goal Wt 170 - 169 - - - -   Wt loss to date 29 - 9 - - - -   % Wt Loss 0.27 - 0.09 - - - -   80% .6 - 105.6 - - - -         Comorbidities:  Hypertension: not applicable  Diabetes: not applicable  Obstructive Sleep Apnea: resolved  Hyperlipidemia: not applicable  Stress Urinary Incontinence: not applicable  Gastroesophageal Reflux: not applicable  Weight related arthropathy:not applicable        History reviewed. No pertinent past medical history. Past Surgical History:   Procedure Laterality Date    HX CHOLECYSTECTOMY      HX GI  01/03/2022    gastric bypass       Current Outpatient Medications   Medication Sig Dispense Refill    phentermine (ADIPEX-P) 37.5 mg tablet Take 1 Tablet by mouth daily. Max Daily Amount: 37.5 mg. 30 Tablet 0    topiramate (TOPAMAX) 25 mg tablet Take 1 Tablet by mouth daily.  30 Tablet 0    multivitamin with iron (FLINTSTONES) chewable tablet Take 1 Tablet by mouth two (2) times a day.  OTHER Calcium citrate 500 millirams 3x a day      cholecalciferol (Vitamin D3) 25 mcg (1,000 unit) cap Take 5,000 Units by mouth daily.  cyanocobalamin (Vitamin B-12) 1,000 mcg tablet Take 1,000 mcg by mouth daily.  thiamine HCL (B-1) 100 mg tablet Take  by mouth daily. Allergies   Allergen Reactions    Bactrim [Sulfamethoprim] Hives    Bactrim [Sulfamethoprim Ds] Hives     Denies allergy 03/25/2019       ROS:  Review of Systems   Constitutional: Positive for weight loss. Negative for malaise/fatigue. Eyes: Negative. Respiratory: Negative. Cardiovascular: Negative for chest pain and palpitations. Gastrointestinal: Negative for abdominal pain, constipation, diarrhea, heartburn, nausea and vomiting. Genitourinary: Negative. Musculoskeletal: Negative. Skin: Negative. Neurological: Negative for dizziness, tingling, loss of consciousness, weakness and headaches. Physicial Exam:  Visit Vitals  /79   Temp 98.2 °F (36.8 °C) (Temporal)   Resp 18   Ht 5' 8\" (1.727 m)   Wt 111.6 kg (246 lb)   LMP 03/07/2022 (Exact Date)   BMI 37.40 kg/m²     Physical Exam  Vitals and nursing note reviewed. Constitutional:       Appearance: She is obese. HENT:      Head: Normocephalic and atraumatic. Cardiovascular:      Rate and Rhythm: Normal rate. Heart sounds: Normal heart sounds. Pulmonary:      Effort: Pulmonary effort is normal.      Breath sounds: Normal breath sounds. Abdominal:      General: Bowel sounds are normal. There is no distension. Palpations: Abdomen is soft. There is no mass. Tenderness: There is no abdominal tenderness. Hernia: No hernia is present. Musculoskeletal:         General: Normal range of motion. Skin:     General: Skin is warm and dry. Neurological:      General: No focal deficit present.       Mental Status: She is alert and oriented to person, place, and time. Psychiatric:         Mood and Affect: Mood normal.         Behavior: Behavior normal.         Labs:   Recent Results (from the past 672 hour(s))   CBC WITH AUTOMATED DIFF    Collection Time: 03/28/22 12:00 AM   Result Value Ref Range    WBC 4.4 3.4 - 10.8 x10E3/uL    RBC 4.56 3.77 - 5.28 x10E6/uL    HGB 12.7 11.1 - 15.9 g/dL    HCT 38.7 34.0 - 46.6 %    MCV 85 79 - 97 fL    MCH 27.9 26.6 - 33.0 pg    MCHC 32.8 31.5 - 35.7 g/dL    RDW 13.2 11.7 - 15.4 %    PLATELET 495 314 - 521 x10E3/uL    NEUTROPHILS 32 Not Estab. %    Lymphocytes 53 Not Estab. %    MONOCYTES 10 Not Estab. %    EOSINOPHILS 4 Not Estab. %    BASOPHILS 1 Not Estab. %    ABS. NEUTROPHILS 1.4 1.4 - 7.0 x10E3/uL    Abs Lymphocytes 2.4 0.7 - 3.1 x10E3/uL    ABS. MONOCYTES 0.4 0.1 - 0.9 x10E3/uL    ABS. EOSINOPHILS 0.2 0.0 - 0.4 x10E3/uL    ABS. BASOPHILS 0.0 0.0 - 0.2 x10E3/uL    IMMATURE GRANULOCYTES 0 Not Estab. %    ABS. IMM. GRANS. 0.0 0.0 - 0.1 [id number]   METABOLIC PANEL, COMPREHENSIVE    Collection Time: 03/28/22 12:00 AM   Result Value Ref Range    Glucose 83 65 - 99 mg/dL    BUN 6 6 - 20 mg/dL    Creatinine 0.84 0.57 - 1.00 mg/dL    eGFR 96 >59 mL/min/1.73    BUN/Creatinine ratio 7 (L) 9 - 23    Sodium 140 134 - 144 mmol/L    Potassium 3.9 3.5 - 5.2 mmol/L    Chloride 102 96 - 106 mmol/L    CO2 22 20 - 29 mmol/L    Calcium 9.1 8.7 - 10.2 mg/dL    Protein, total 6.9 6.0 - 8.5 g/dL    Albumin 4.2 3.9 - 5.0 g/dL    GLOBULIN, TOTAL 2.7 1.5 - 4.5 g/dL    A-G Ratio 1.6 1.2 - 2.2    Bilirubin, total 0.4 0.0 - 1.2 mg/dL    Alk.  phosphatase 53 44 - 121 IU/L    AST (SGOT) 27 0 - 40 IU/L    ALT (SGPT) 32 0 - 32 IU/L   VITAMIN B12 & FOLATE    Collection Time: 03/28/22 12:00 AM   Result Value Ref Range    Vitamin B12 >2000 (H) 232 - 1245 pg/mL    Folate 15.2 >3.0 ng/mL   VITAMIN D, 25 HYDROXY    Collection Time: 03/28/22 12:00 AM   Result Value Ref Range    VITAMIN D, 25-HYDROXY 61.0 30.0 - 100.0 ng/mL   IRON Collection Time: 03/28/22 12:00 AM   Result Value Ref Range    Iron 77 27 - 159 ug/dL   FERRITIN    Collection Time: 03/28/22 12:00 AM   Result Value Ref Range    Ferritin 45 15 - 150 ng/mL   [facility] SPECIMEN COL    Collection Time: 03/28/22  2:06 PM   Result Value Ref Range    [facility]. Specimens collected/sent to [facility]. Please direct inquiries to ([phone]). Assessment/Plan:   She is currently 3 months s/p laparoscopic gastric bypass surgery with a total weight loss of 29 lbs to date, doing well, but having less than anticipated weight loss. Labs were reviewed and pt was instructed to continue MVI/B1/B12/D supplementation  Pt denies snacking or eating high density carbohydrates for slowing down weight loss. She desires to continue medical weight loss with our practice since we are following her for her bariatric surgery follow up. Will prescribe previous dosing of phentermine/topirate to assist with further weight loss. She denies any previous SE and notes with Dr. Wei Setting reviewed. We have reviewed the components of a successful postoperative course including requirement for a high protein, low carbohydrate diet, 64 oz a day of zero calorie liquids, daily vitamin supplementation, daily exercise (150 mis/week), regular follow-up, and participation in support groups. Refer to registered dietitian for more detailed medical nutrition therapy as needed. The primary encounter diagnosis was Post-resection malabsorption. Diagnoses of S/P gastric bypass, Obesity (BMI 30-39.9), and BMI 37.0-37.9, adult were also pertinent to this visit. Follow-up and Dispositions    · Return in about 4 weeks (around 4/29/2022). sooner as needed.   Jake Valdivia NP

## 2022-04-10 LAB — VIT B1 BLD-SCNC: 164.3 NMOL/L (ref 66.5–200)

## 2022-06-17 ENCOUNTER — HOSPITAL ENCOUNTER (OUTPATIENT)
Dept: BARIATRICS/WEIGHT MGMT | Age: 32
Discharge: HOME | End: 2022-06-17

## 2022-06-17 ENCOUNTER — DOCUMENTATION ONLY (OUTPATIENT)
Dept: BARIATRICS/WEIGHT MGMT | Age: 32
End: 2022-06-17

## 2022-06-17 NOTE — PROGRESS NOTES
[address] Kristina Loss Alberto Taylor [address] [facility], [address]    Nutrition Follow up Progress Note      Patient's Name: Cierra Plascencia   Age: 29 y.o. YOB: 1990   Sex: female    Date:   6/17/2022    Surgery Date: 1/3/22    Height: 5 f 8   Starting Weight: 275  Current Weight:    230          Overall Pounds Lost: 45  Last Recorded Weight: 2/11: 256  BMI: 35.0     Procedure: Gastric Bypass    Last Nutrition Concerns: Less than expected weight loss    She has been prescribed phentermine by Paty Bello. Diet History: Breakfast:  Fair Life  Breakfast-Lunch:  None. Lunch:  Salad, lettuce, tomatoes, cucumbers. She states she will use a fat free dressing. Lunch-Dinner:  States she may have a granola bar. She states this is not something that she is not frequently doing. Dinner:  Fish. She state she will have steamed broccoli with this. She states when her cycle is coming on, she is getting sugar free popsicles. Patient is drinking over 64 ounces of water per day. No soda, sweet tea, or fruit juice. She states she is going out to eat far less than she was before. Portions:  She states she measures her portions    Exercise Level: Treadmill, bicycle, or walking. Treadmill:  1 hour. Bike:  30 minutes     Vitamin Intake:   Patient states she is taking 2 Flinstones complete per day, 100 mg of B1, 1000 mcg of B12, 1500 mg of calcium, 5000 IU of Vitamin D3    Weight loss is behind schedule, but based on her diet history, she is following the protocol. I am concerned that she is not getting enough protein. She is eating 1 meal of protein, shake for breakfast, but just a salad for lunch. I have given her some guidelines on increasing this, which includes implementing 3 meals per day of protein in addition to the shakes. We talked about some meal choices that she could add in. Goal for protein based on her weight is 80 grams.       Continue to follow key diet principles and keep up with her exercise and protein/vegetables    Plan: Will follow up in 3 months. Patient will call once she has her work schedule.     Dionne Chavez MS RD

## 2022-07-13 ENCOUNTER — DOCUMENTATION ONLY (OUTPATIENT)
Dept: BARIATRICS/WEIGHT MGMT | Age: 32
End: 2022-07-13

## 2022-07-13 ENCOUNTER — HOSPITAL ENCOUNTER (OUTPATIENT)
Dept: BARIATRICS/WEIGHT MGMT | Age: 32
Discharge: HOME | End: 2022-07-13

## 2022-07-13 NOTE — PROGRESS NOTES
[facility] SURGICAL WEIGHT LOSS  Pregnancy Post-op Nutrition Protocol    Patient's Name: Rose Cowart  YOB: 1990  Surgery Date: 1/3/22    Procedure: Gastric Bypass    Height: 5 f 8   Pre-Op Weight: 275   Current Weight: 230  Weight Lost: 45  Goal Weight:      BMI:    35.0   Past Weights: 6/17/22: 233      DARYL: Positive pregnancy test, but unsure of due date. She is guessing she may be about 5 weeks post op. Problem Areas:   Nausea:  None   Vomiting: None   Dumping Syndrome: None  Inadequate Protein: She states she is doing 1 Fair Life per day. Inadequate Fluids: None, patient states she is getting 64 ounces of fluid per day. Food Intolerance: None  Hunger: None  Constipation: None    Eating 3 Meals/Day:  Yes. She states she is not always hungry though. Portion Size at Meals:  Palm size. States she is measuring her portions out. Foods being consumed:  Breakfast: Time:Boiled egg. She states she will sometimes have cereal (like Frosted Flakes)  She states today was her first time having it. Lunch: Time: Salad  . She states she will put protein on top of it and may have an apple, too. Dinner:  Time: Fish, broccoli. She states she tries to avoid rice. In-between eating: She states she is not having a lot of cravings. She states she may have an apple between her meals. Length of time for meals: 20-30 minutes  food/fluids: 30/30    Fluids: 64+ oz/day Types of Fluids: water, protein drink    States she stopped everything to see if she was pregnant for sure. Patient has been educated on the [address] [place] [address] Prenatal vitamin, which is BID. Plus 1500 mg of calcium citrate. She has been educated to take 2 of these prenatal pills per day. Protein Supplement: Fair Life   Grams of Protein: 30   Mixed with:    Splitting Protein Drink in 1/2:    Timing of Protein Drinks:   Patient is taking 7 days a week.   Increase to 1.5-2 protein shakes per day    Exercise: Walking 20 minutes, a mile. Comments:      Patient is 6 months post op and recently found out she is pregnant. She will go to an OB/GYN on 8/1, but did go to [facility] for a confirmation. Patient states she is still eating 3 meals a day, but recently added in cereal in the morning, which I have discouraged her from doing due to the carbohydrates and the risk of reactive hypoglycemia. We have talked about eating her 3 meals a day, but increasing her protein shake to 1.5 per day to provide her with 45 grams of protein and 240 calories. As she gets further along in her pregnancy, she will need to increase to BID protein shakes per day. She stopped all her vitamins when she found out she was pregnant. I have advised her to go to www. [employer] and purchase the Bariatric Prenatal vitamin, which she will need to do 2 x a day and will meet her needs for pregnancy and provide her with adequate folate and other nutrients and will meet her bariatric vitamins requirements. We also talked about some healthy snacks to use in between if she is physically hungry. Patient understood these instructions and stated she will start back on the vitamins. I will follow up in 3 months.     Pregnancy Vitamin Protocol Reviewed x        Diet Follow Up: October 12 at 2:45 pm    Kassandra Esposito 87 RD    7/13/2022

## 2022-09-06 ENCOUNTER — TELEPHONE (OUTPATIENT)
Dept: SURGERY | Age: 32
End: 2022-09-06

## 2022-09-06 NOTE — TELEPHONE ENCOUNTER
Pt mother called in concerned that pt was not feeling well. She reports pt is two months pregnant and she is concerned about her and not sure what she should do. She reports that pt is drinking around 16 oz daily with ginger ale and water. She has not taken her vitamins in about a week due to HA. She was taking BC powder regularly due to HA. She is eating small meals but will feel very tired after and have to go lie down. She has been following up with PCP for pregnancy but has not yet seen an OB. Mother reports she has an appointment with a high risk OB on 9/15. Discussed to avoid sugary drinks, push fluids such as SF electrolyte drinks, Crystal Lite, broth, protein shakes, etc aiming for at least 64 oz daily. Restart vitamins, do not take NSAIDS such as ibuprofen, naproxen, aspirin, or BC powder. Continue with small meals 4-6 times daily focusing on protein and proceed with follow up with OB. Pt mother verbalized understanding but requested pt be seen today. Pt scheduled for 3:30 today with KATIE Hager.

## 2022-09-07 DIAGNOSIS — Z13.0 SCREENING, ANEMIA, DEFICIENCY, IRON: ICD-10-CM

## 2022-09-07 DIAGNOSIS — E66.9 CLASS 2 OBESITY WITH BODY MASS INDEX (BMI) OF 37.0 TO 37.9 IN ADULT, UNSPECIFIED OBESITY TYPE, UNSPECIFIED WHETHER SERIOUS COMORBIDITY PRESENT: Primary | ICD-10-CM

## 2022-09-07 DIAGNOSIS — Z13.6 SCREENING, ISCHEMIC HEART DISEASE: ICD-10-CM

## 2022-09-07 DIAGNOSIS — Z13.1 SCREENING FOR DIABETES MELLITUS: ICD-10-CM

## 2022-09-07 DIAGNOSIS — Z34.90 PREGNANCY, UNSPECIFIED GESTATIONAL AGE: ICD-10-CM

## 2022-09-07 DIAGNOSIS — Z11.59 ENCOUNTER FOR HEPATITIS C SCREENING TEST FOR LOW RISK PATIENT: ICD-10-CM

## 2022-09-07 DIAGNOSIS — Z86.39 HISTORY OF VITAMIN D DEFICIENCY: ICD-10-CM

## 2022-10-12 ENCOUNTER — DOCUMENTATION ONLY (OUTPATIENT)
Dept: BARIATRICS/WEIGHT MGMT | Age: 32
End: 2022-10-12

## 2022-10-12 ENCOUNTER — HOSPITAL ENCOUNTER (OUTPATIENT)
Dept: BARIATRICS/WEIGHT MGMT | Age: 32
Discharge: HOME | End: 2022-10-12

## 2022-10-12 NOTE — PROGRESS NOTES
[address] Kristina Loss Alberto Taylor [address] [facility], [address]    Nutrition Follow up Progress Note      Patient's Name: Dmitry Vang   Age: 29 y.o. YOB: 1990   Sex: female    Date:   10/12/2022    Surgery Date: 1/3/22    Height: 5 f 8   Starting Weight: 275  Current Weight:    215          Overall Pounds Lost: 60  Last Recorded Weight: 7/13/22: 230  BMI: 32.7     Procedure: Gastric Bypass    Last Nutrition Concerns: Pregnancy. DARYL: 3/3/23      Nausea:  None. She states this has subsided  Vomiting:  None  She states she is getting really bad headaches daily    Patient is seeing high risk OB GYN at [facility] on 10/19. Diet History: Patient states she is eating 3 meals per day. Patient states she is doing a Premier for breakfast.  Lunch:  Chicken salad. Dinner:  She states her mom cooks this and will make baked chicken or broccoli. She states she tolerates this well. She states she is doing 1 protein shake per day. She states she is eating a lot of vegetables or will have an apple or banana for lunch, but is trying to stay away from the carbohydrates  She states she is eating a lot of yogurt between meals. She is only at 2 bottles of water per day. Exercise Level: She states she is walking 2 hours per day. Vitamin Intake: She states she has stopped taking all of her vitamins, but has started back on this. She states she is doing the Flinstones Complete BID, 100 mg of B1. She states she is not taking B12 because she is concerned about her baby, but will address with obgyn next week. 1500 mg calcium citrate per day. Goals:   Patient is doing better with the nausea. She is being followed by high risk obgyn and will see her next week. Continue with 6 small meals per day  Focusing on protein and vegetables  1 protein shake per day  Continue with vitamins.   She is not taking B12, worried about baby, but will consult with obgyn.    Discussed healthy or protein based snacks      Plan: Patient will follow up as needed and after the baby is born.     Debbie Engel MS RD

## 2022-12-07 ENCOUNTER — DOCUMENTATION ONLY (OUTPATIENT)
Dept: SURGERY | Age: 32
End: 2022-12-07

## 2022-12-07 NOTE — PROGRESS NOTES
Per [facility] requirements;  E-mail and letter sent for follow up appointment. Gagandeep Roman 94      Dear Patient,    Your health is our main concern. It is important for your health to have follow-up lab work and to see your surgeon at 3 months, 6 months and annually after your weight loss surgery. Additionally, the Department of bariatric Surgery at our hospital is a member of the Metabolic and Bariatric Surgery Accreditation and Quality Improvement Program [facility]). As a participant in this program, we gather information on the outcomes of our patients after surgery. Please call the office for a follow up appointment at [phone] \[facility] [place] [facility]\"") or [phone] [facility]). If you have moved out of the area or have changed surgeons please call us and let us know the name of your doctor. Your health and feedback are important to us. We greatly appreciate your response.        Thank you,  Gagandeep Jonatohn [place] [facility] [address]

## 2023-02-23 ENCOUNTER — HOSPITAL ENCOUNTER (EMERGENCY)
Facility: HOSPITAL | Age: 33
Discharge: ANOTHER HOSPITAL | End: 2023-02-23
Attending: EMERGENCY MEDICINE
Payer: MEDICARE

## 2023-02-23 VITALS
TEMPERATURE: 98.2 F | OXYGEN SATURATION: 100 % | HEART RATE: 80 BPM | RESPIRATION RATE: 12 BRPM | DIASTOLIC BLOOD PRESSURE: 98 MMHG | SYSTOLIC BLOOD PRESSURE: 119 MMHG

## 2023-02-23 DIAGNOSIS — Z37.9 NORMAL LABOR: Primary | ICD-10-CM

## 2023-02-23 PROCEDURE — 99285 EMERGENCY DEPT VISIT HI MDM: CPT

## 2023-02-23 ASSESSMENT — ENCOUNTER SYMPTOMS
ABDOMINAL PAIN: 1
VOMITING: 0
NAUSEA: 0
SHORTNESS OF BREATH: 0
COUGH: 0

## 2023-02-23 ASSESSMENT — PAIN - FUNCTIONAL ASSESSMENT: PAIN_FUNCTIONAL_ASSESSMENT: 0-10

## 2023-02-23 NOTE — ED PROVIDER NOTES
EMERGENCY DEPARTMENT HISTORY AND PHYSICAL EXAM      Date: 2/23/2023  Patient Name: Yossi Rahman    History of Presenting Illness     No chief complaint on file. History Provided By: patient    This is a 68-year-old female G1, P0 at about 38 weeks who presents to the emerged part with complaints of abdominal cramping that began about 1 hour ago. She states that contractions are coming about every 15 minutes, she has not had any gush of fluid, she has not had any vaginal bleeding. She states that her pregnancy has been uncomplicated. She states that she did not know what hospital she was supposed to deliver at so came here. She does report that she has been receiving [facility], she was supposed to have an appointment in 4 days. PCP: Sunny Black MD    No current facility-administered medications for this encounter. Current Outpatient Medications   Medication Sig Dispense Refill    vitamin D 25 MCG (1000 UT) CAPS Take 5,000 Units by mouth daily      cyanocobalamin 1000 MCG tablet Take 1,000 mcg by mouth daily      phentermine (ADIPEX-P) 37.5 MG tablet Take 37.5 mg by mouth daily. thiamine 100 MG tablet Take by mouth daily      topiramate (TOPAMAX) 25 MG tablet Take 25 mg by mouth daily         Past History     Past Medical History:  History reviewed. No pertinent past medical history. Past Surgical History:  Past Surgical History:   Procedure Laterality Date    CHOLECYSTECTOMY      GI  01/03/2022    gastric bypass       Family History:  Family History   Problem Relation Age of Onset    Breast Cancer Maternal Grandmother        Social History:  Social History     Tobacco Use    Smoking status: Never    Smokeless tobacco: Never    Tobacco comments:     Quit smoking: vape no tobacco   Substance Use Topics    Alcohol use: Not Currently    Drug use: Never       Allergies:   Allergies   Allergen Reactions    Sulfamethoxazole-Trimethoprim Hives    Sulfa Antibiotics Hives     Denies allergy 03/25/2019         Review of Systems       Review of Systems   Constitutional:  Negative for appetite change, fatigue and fever. Respiratory:  Negative for cough and shortness of breath. Cardiovascular:  Negative for chest pain. Gastrointestinal:  Positive for abdominal pain. Negative for nausea and vomiting. Physical Exam   BP (!) 138/100   Pulse 93   Temp 98.2 °F (36.8 °C) (Oral)   Resp 18   SpO2 100%       Physical Exam  Vitals and nursing note reviewed. Constitutional:       General: She is in acute distress. HENT:      Head: Normocephalic and atraumatic. Mouth/Throat:      Mouth: Mucous membranes are moist.      Pharynx: Oropharynx is clear. Eyes:      Extraocular Movements: Extraocular movements intact. Conjunctiva/sclera: Conjunctivae normal.   Cardiovascular:      Rate and Rhythm: Normal rate and regular rhythm. Pulses: Normal pulses. Heart sounds: Normal heart sounds. Pulmonary:      Effort: Pulmonary effort is normal.      Breath sounds: Normal breath sounds. Abdominal:      Comments: Abdomen is appropriately gravid   Genitourinary:     Comments: Vaginal exam reveals a cervix that may be dilated as much is a fingertip. There is no bleeding, no fluid. Skin:     General: Skin is warm and dry. Neurological:      General: No focal deficit present. Mental Status: She is alert. Mental status is at baseline. Psychiatric:         Mood and Affect: Mood normal.         Behavior: Behavior normal.         Thought Content: Thought content normal.         Judgment: Judgment normal.         Diagnostic Study Results     Labs -  No results found for this or any previous visit (from the past 12 hour(s)). Radiologic Studies -   No orders to display           Medical Decision Making   I am the first provider for this patient.     I reviewed the vital signs, available nursing notes, past medical history, past surgical history, family history and social history. Vital Signs-Reviewed the patient's vital signs. EKG:     ED Course: Progress Notes, Reevaluation, and Consults:    Provider Notes (Medical Decision Making):       [facility]        ED Course as of 02/23/23 1040   Thu Feb 23, 2023   1038 Spoke with Dr. Mitchell Mccray from [facility] who accepts patient, she will go to labor and delivery triage upon arrival. [JR]      ED Course User Index  [JR] Mady Reyes MD         Procedures          Diagnosis     Clinical Impression: No diagnosis found. Disposition: transfer to [facility] L&D    @[email or web address]@     @[email or web address]@  Disclaimer: Sections of this note are dictated using utilizing voice recognition software. Minor typographical errors may be present. If questions arise, please do not hesitate to contact me or call our department.           Mady Reyes MD  02/23/23 8660

## 2023-02-23 NOTE — ED TRIAGE NOTES
Patient via wheelchair to triage c/o abdominal contractions that started one hour PTA. States she is 9 months pregnant  with an DOROTHY of 3/4/2023. Contractions are occurring roughly every 15 minutes. Denies ROM.

## (undated) DEVICE — SET SUCT IRR TIP DISP STRYKEFLOW2

## (undated) DEVICE — SCISSORS ENDOSCP DIA5MM CRV MPLR CAUT W/ RATCH HNDL

## (undated) DEVICE — TROCAR LAP L100MM DIA5MM BLDELSS W/ STBL SL ENDOPATH XCEL

## (undated) DEVICE — 3M™ STERI-STRIP™ COMPOUND BENZOIN TINCTURE 40 BAGS/CARTON 4 CARTONS/CASE C1544: Brand: 3M™ STERI-STRIP™

## (undated) DEVICE — GARMENT,MEDLINE,DVT,INT,CALF,LG, GEN2: Brand: MEDLINE

## (undated) DEVICE — RELOAD STPL L60MM H1.5-3.6MM REG TISS BLU GRIPPING SURF B

## (undated) DEVICE — BAG DRNGE C650ML H SZ 5-38 MAMM TISS EXP CNTOUR PROF NACL

## (undated) DEVICE — HANDLE PRB DIA5MM HND CTRL PSTL GRP ENDOPATH PRB + II

## (undated) DEVICE — COVER,LIGHT HANDLE,FLX,1/PK: Brand: MEDLINE INDUSTRIES, INC.

## (undated) DEVICE — TROCAR ENDOSCP SHFT L100MM DIA12MM INTEGR STBL ENDOPATH

## (undated) DEVICE — STAPLER SKIN LN REINF 60 MM ECHELON ENDOPATH

## (undated) DEVICE — TROCAR ENDOSCP L100MM DIA12MM STBL SL BLDELSS ENDOPATH XCEL

## (undated) DEVICE — STAPLE INT WHT BLU G GRN BLK REINF FOR ENDOPATH ECHELON FLX

## (undated) DEVICE — SUTURE VCRL SZ 3-0 L27IN ABSRB VLT L26MM SH 1/2 CIR J316H

## (undated) DEVICE — TRUE CONTENT TO BE POPULATED AS PART OF REBRANDING: Brand: ARGYLE

## (undated) DEVICE — BLANKET WRM AD W50XL85.8IN PACU FULL BODY FORC AIR

## (undated) DEVICE — TROCAR ENDOSCP BLDELSS 12X100 MM W/ HNDL STBL SL OPT TIP

## (undated) DEVICE — PACK PROCEDURE SURG LAPAROSCOPY 17X7 MM BRTRC PRIMUS

## (undated) DEVICE — GAUZE SPONGES,8 PLY: Brand: CURITY

## (undated) DEVICE — REM POLYHESIVE ADULT PATIENT RETURN ELECTRODE: Brand: VALLEYLAB

## (undated) DEVICE — TISSUE RETRIEVAL SYSTEM: Brand: INZII RETRIEVAL SYSTEM

## (undated) DEVICE — 4-PORT MANIFOLD: Brand: NEPTUNE 2

## (undated) DEVICE — SHEARS ENDOSCP HARM 36CM ULTRASONIC CRV TIP UPGRD

## (undated) DEVICE — SOLUTION IRRIG 1000ML H2O STRL BLT

## (undated) DEVICE — SUT SLK 2-0SH 30IN BLK --

## (undated) DEVICE — SUTURE VCRL SZ 2-0 L54IN ABSRB VLT W/O NDL POLYGLACTIN 910 J618H

## (undated) DEVICE — STAPLER SKIN L440MM 32MM LNG 12 FIRING B FRM PWR + GRIPPING

## (undated) DEVICE — RELOAD STPL L60MM H1-2.6MM MESENTERY THN TISS WHT 6 ROW

## (undated) DEVICE — SOFT SILICONE HYDROCELLULAR SACRUM DRESSING WITH LOCK AWAY LAYER: Brand: ALLEVYN LIFE SACRUM (LARGE) PACK OF 10

## (undated) DEVICE — TAPE ADH W3INXL10YD PLAS TRNSPAR H2O RESIST HYPOALRG CURAD

## (undated) DEVICE — STRIP,CLOSURE,WOUND,MEDI-STRIP,1/2X4: Brand: MEDLINE

## (undated) DEVICE — SUTURE MCRYL SZ 4-0 L27IN ABSRB UD L24MM PS-1 3/8 CIR PRIM Y935H

## (undated) DEVICE — BLANKET WRM W29.9XL79.1IN UP BODY FORC AIR MISTRAL-AIR

## (undated) DEVICE — INTENDED FOR TISSUE SEPARATION, AND OTHER PROCEDURES THAT REQUIRE A SHARP SURGICAL BLADE TO PUNCTURE OR CUT.: Brand: BARD-PARKER SAFETY BLADES SIZE 11, STERILE

## (undated) DEVICE — GLOVE SURG SZ 7 L11.33IN FNGR THK9.8MIL STRW LTX POLYMER

## (undated) DEVICE — SOLUTION IV 1000ML 0.9% SOD CHL